# Patient Record
Sex: MALE | Race: WHITE | Employment: FULL TIME | URBAN - NONMETROPOLITAN AREA
[De-identification: names, ages, dates, MRNs, and addresses within clinical notes are randomized per-mention and may not be internally consistent; named-entity substitution may affect disease eponyms.]

---

## 2015-01-06 LAB
AVERAGE GLUCOSE: NORMAL
HBA1C MFR BLD: 5.7 %

## 2017-12-20 LAB
CHOLESTEROL, TOTAL: NORMAL
CHOLESTEROL/HDL RATIO: NORMAL
HDLC SERPL-MCNC: NORMAL MG/DL
LDL CHOLESTEROL CALCULATED: NORMAL
TRIGL SERPL-MCNC: NORMAL MG/DL
VLDLC SERPL CALC-MCNC: NORMAL MG/DL

## 2019-06-22 PROBLEM — R00.0 TACHYCARDIA: Status: ACTIVE | Noted: 2019-06-22

## 2019-06-22 PROBLEM — N52.9 ERECTILE DYSFUNCTION: Status: ACTIVE | Noted: 2019-06-22

## 2019-06-22 PROBLEM — G89.29 CHRONIC PAIN: Status: ACTIVE | Noted: 2019-06-22

## 2019-06-22 PROBLEM — E66.09 CLASS 2 OBESITY DUE TO EXCESS CALORIES WITH BODY MASS INDEX (BMI) OF 35.0 TO 35.9 IN ADULT: Status: ACTIVE | Noted: 2019-06-22

## 2019-06-22 PROBLEM — E66.812 CLASS 2 OBESITY DUE TO EXCESS CALORIES WITH BODY MASS INDEX (BMI) OF 35.0 TO 35.9 IN ADULT: Status: ACTIVE | Noted: 2019-06-22

## 2019-06-22 PROBLEM — I10 HYPERTENSION: Status: ACTIVE | Noted: 2019-06-22

## 2019-06-22 NOTE — PROGRESS NOTES
19  Carlo Parmar : 1965 Sex: male  Age: 52 y.o. No chief complaint on file. This is a patient who did not follow-up with Dr Mathew Vázquez. Because of the delay I will send him to Dr. Bassam Ramires. Patient is not having GI symptomatology. He states his restless leg symptoms have worsened. He is now using a full milligram of Requip. Patient is refusing prostate exam today because he has had some GI problems. The patient also is concerned about low energy levels. Previously had documented low testosterone levels. He did have an MRI of his pituitary gland to assess this and did see endocrinology. They were thinking that it was secondary to his chronic narcotic use. Patient does have chronic pain but he and I had a joseluis discussion about trying to wean narcotic medications. I have given him a schedule how to do this over the next 3 or 4 months. We will check baseline levels of luteinizing hormone, follicle-stimulating hormone, thyroid-stimulating hormone and other blood work in a fasting state. This will also need to be repeated at least in terms of the testosterone to verify low testosterone levels. Patient may benefit from androgen gel or something similar but probably he would benefit the most if he is able to wean from narcotic analgesics.       Review of Systems  Health Maintenance:  Colonoscopy Screening - (2015)  Influenza Vaccination - (2015)  Physical Exam - (2018)  Prostate Exam - (2018)  Psa Test - (2018)  Rectal Exam - (2018)  Colonoscopy - (2015) KOLOZSI-NEXT   Medical Problems:  Hypertension, Hypercholesterolemia, Osteoarthritis  Panic Disorder - (2014) RESTARTED ZOLOFT  Anxiety  Decreased Libido - (8/10/2012) EVALUATED BY DARREN  Cervical Radiculopathy - (2017) RECURRENT  Lumbar Strain - (2016) RECURRENT  Tachycardia - (2014) RESTARTED TOPROL  Hypogonadism - (2017) REPEAT [facility] work-up underway 2019  Restless Leg Syndrome - (6/19/2018) TRIAL REQUIP-increase Requip June 25, 2019  Carpal Tunnel Syndrome - (12/21/2018) RIGHT-EMG ORDERED  Right Knee Pain - (12/21/2018) ? ? OA  Reviewed, no changes. FH:  Father:  Coronary Artery Disease (CAD). Mother:  . (Hx)  Reviewed, no changes. SH:  Marital: . Personal Habits: Cigarette Use: Negative For current cigarette smoker. Alcohol: Rarely consumes  alcohol. Exercise Type: exercises sporadically. Reviewed, no changes. Date: 06/25/2019  Was the patient queried about smoking behavior? Yes   Does the patient currently smoke? Smoking: Patient is a current smoker, smokes some days -  CIGARS. Current Outpatient Medications:     ALPRAZolam (XANAX) 0.25 MG tablet, Take 0.25 mg by mouth 3 times daily as needed for Sleep., Disp: , Rfl:     metoprolol succinate (TOPROL XL) 50 MG extended release tablet, Take 1 tablet by mouth daily, Disp: 30 tablet, Rfl: 5    rosuvastatin (CRESTOR) 20 MG tablet, Take 1 tablet by mouth daily, Disp: 30 tablet, Rfl: 3    sertraline (ZOLOFT) 100 MG tablet, Take 2 tablets by mouth daily, Disp: 60 tablet, Rfl: 5    sildenafil (VIAGRA) 50 MG tablet, Take 1 tablet by mouth as needed for Erectile Dysfunction, Disp: 30 tablet, Rfl: 0    [START ON 7/25/2019] HYDROcodone-acetaminophen (NORCO) 7.5-325 MG per tablet, Take 1 tablet by mouth every 6 hours as needed for Pain for up to 30 days. , Disp: 120 tablet, Rfl: 0    [START ON 8/24/2019] HYDROcodone-acetaminophen (NORCO) 7.5-325 MG per tablet, Take 1 tablet by mouth every 6 hours as needed for Pain for up to 30 days. , Disp: 120 tablet, Rfl: 0    rOPINIRole (REQUIP) 1 MG tablet, Take 1 tablet by mouth nightly One po qhs, Disp: 30 tablet, Rfl: 5    naproxen (NAPROSYN) 500 MG tablet, Take 1 tablet by mouth 2 times daily as needed for Pain Take with food, Disp: 14 tablet, Rfl: 0    valsartan-hydrochlorothiazide (DIOVAN HCT) 320-25 MG per tablet, Take 1 tablet by mouth daily for 7 days, Disp: 7 tablet, Rfl: 0  No Known Allergies    Past Medical History:   Diagnosis Date    Anxiety     Carpal tunnel syndrome of right wrist     Cervical radiculopathy 04/11/2016    recurrent    Chronic pain     Erectile dysfunction     Hyperlipidemia     Hypertension     Hypogonadism in male 12/20/2017    Lumbar strain 12/13/2016    recurrent    Osteoarthritis     Panic disorder 07/23/2014    Restless legs syndrome     Sleep apnea     Tachycardia 07/23/2014     Past Surgical History:   Procedure Laterality Date    NECK SURGERY      ablation     Family History   Problem Relation Age of Onset    Mental Illness Father         anxiety    Coronary Art Dis Father      Social History     Socioeconomic History    Marital status:      Spouse name: Not on file    Number of children: Not on file    Years of education: Not on file    Highest education level: Not on file   Occupational History    Not on file   Social Needs    Financial resource strain: Not on file    Food insecurity:     Worry: Not on file     Inability: Not on file    Transportation needs:     Medical: Not on file     Non-medical: Not on file   Tobacco Use    Smoking status: Never Smoker    Smokeless tobacco: Never Used   Substance and Sexual Activity    Alcohol use: Yes     Comment: rarely consumes    Drug use: No    Sexual activity: Not on file   Lifestyle    Physical activity:     Days per week: Not on file     Minutes per session: Not on file    Stress: Not on file   Relationships    Social connections:     Talks on phone: Not on file     Gets together: Not on file     Attends Sikhism service: Not on file     Active member of club or organization: Not on file     Attends meetings of clubs or organizations: Not on file     Relationship status: Not on file    Intimate partner violence:     Fear of current or ex partner: Not on file     Emotionally abused: Not on file     Physically abused: Not on file     Forced sexual activity: Not on file   Other Topics Concern    Not on file   Social History Narrative    Not on file       Vitals:    06/25/19 1611   BP: 136/80   Pulse: 86   Temp: 97 °F (36.1 °C)   SpO2: 99%   Weight: 295 lb (133.8 kg)   Height: 6' 2\" (1.88 m)       Physical Exam  Objective    Exam:  Const: Appears healthy,well developed and well nourished. Appears obese. Eyes: EOMI in both eyes. PERRL. ENMT: External canals are clear and dry. Tympanic membranes are intact. External nose WNL. Moistness and normal color of the nasal mucosae. Septum is in the midline. Dentition is in good  repair. Gums appear healthy. Posterior pharynx shows no exudate, irritation or redness. Neck: Supple and symmetric. Palpation reveals no adenopathy. No masses appreciated. Thyroid  exhibits no nodules or thyromegaly. No JVD. Resp: Respirations are unlabored. Respiration rate is normal. Auscultate good airflow. No rales,  rhonchi or wheezes appreciated over the lungs bilaterally. CV: Rhythm is regular. S1 is normal. S2 is normal. Grade 2/6, systolic murmur. Carotids: no  bruits. Abdominal aorta is not palpable. Pedal pulses: 2+ and equal bilaterally. Extremities: No clubbing, cyanosis or edema. Abdomen: Bowel sounds are normoactive. Palpation of the abdomen reveals softness, but no  distension, organomegaly or tenderness. No abdominal masses. No palpable hepatosplenomegaly. Musculo: Walks with a normal gait. Upper Extremities: Full ROM bilaterally. Lower Extremities:  Crepitation of the lower extremities. Skin: Skin is warm and dry. Neuro: Alert and oriented x3. Mood is normal. Affect is normal. Speech is articulate and fluent. Reflexes: DTR's are intact, symmetric and 2+ bilaterally, Tinel sign is negative but Phalen's sign is  positive on the right. Psych: Patient's attitude is cooperative. Patient's affect is appropriate. Judgement is realistic. Insight  is appropriate.   Diagnoses and all orders for this visit:    Mixed hyperlipidemia  - rosuvastatin (CRESTOR) 20 MG tablet; Take 1 tablet by mouth daily    Essential hypertension  -     Comprehensive Metabolic Panel; Future    Erectile dysfunction due to arterial insufficiency  -     sildenafil (VIAGRA) 50 MG tablet; Take 1 tablet by mouth as needed for Erectile Dysfunction    Other chronic pain  -     Discontinue: HYDROcodone-acetaminophen (NORCO) 7.5-325 MG per tablet; Take 1 tablet by mouth every 6 hours as needed for Pain for up to 30 days.  -     HYDROcodone-acetaminophen (NORCO) 7.5-325 MG per tablet; Take 1 tablet by mouth every 6 hours as needed for Pain for up to 30 days.  -     HYDROcodone-acetaminophen (NORCO) 7.5-325 MG per tablet; Take 1 tablet by mouth every 6 hours as needed for Pain for up to 30 days. Primary osteoarthritis involving multiple joints  -     HYDROcodone-acetaminophen (NORCO) 7.5-325 MG per tablet; Take 1 tablet by mouth every 6 hours as needed for Pain for up to 30 days. Tachycardia  -     metoprolol succinate (TOPROL XL) 50 MG extended release tablet; Take 1 tablet by mouth daily    Class 2 obesity due to excess calories without serious comorbidity with body mass index (BMI) of 35.0 to 35.9 in adult    Hypogonadism in male  -     PSA SCREENING; Future  -     Testosterone; Future  -     Follicle Stimulating Hormone; Future  -     LUTEINIZING HORMONE; Future  -     TSH; Future    Restless leg  -     Discontinue: rOPINIRole (REQUIP) 0.5 MG tablet; Take 2 tablets by mouth nightly One po qhs  -     CBC; Future  -     HYDROcodone-acetaminophen (NORCO) 7.5-325 MG per tablet; Take 1 tablet by mouth every 6 hours as needed for Pain for up to 30 days. -     rOPINIRole (REQUIP) 1 MG tablet; Take 1 tablet by mouth nightly One po qhs    MAURICIO (generalized anxiety disorder)  -     sertraline (ZOLOFT) 100 MG tablet;  Take 2 tablets by mouth daily    Malignant neoplasm of colon, unspecified part of colon [facility])  -     External Referral To Gastroenterology    Patient will have work-up regarding his low testosterone level. He will be seen back in 3 months. I have given him a gradual to try to wean narcotic analgesics. She will have a prostate exam next visit. Return in about 3 months (around 9/25/2019).       Seen By:  Kurt Samuel MD

## 2019-06-25 ENCOUNTER — OFFICE VISIT (OUTPATIENT)
Dept: FAMILY MEDICINE CLINIC | Age: 54
End: 2019-06-25
Payer: COMMERCIAL

## 2019-06-25 VITALS
TEMPERATURE: 98.7 F | WEIGHT: 295 LBS | BODY MASS INDEX: 37.86 KG/M2 | DIASTOLIC BLOOD PRESSURE: 84 MMHG | HEIGHT: 74 IN | HEART RATE: 106 BPM | OXYGEN SATURATION: 93 % | SYSTOLIC BLOOD PRESSURE: 116 MMHG

## 2019-06-25 VITALS
BODY MASS INDEX: 37.86 KG/M2 | TEMPERATURE: 97 F | OXYGEN SATURATION: 99 % | HEART RATE: 86 BPM | HEIGHT: 74 IN | DIASTOLIC BLOOD PRESSURE: 80 MMHG | WEIGHT: 295 LBS | SYSTOLIC BLOOD PRESSURE: 136 MMHG

## 2019-06-25 DIAGNOSIS — F41.1 GAD (GENERALIZED ANXIETY DISORDER): ICD-10-CM

## 2019-06-25 DIAGNOSIS — I10 ESSENTIAL HYPERTENSION: ICD-10-CM

## 2019-06-25 DIAGNOSIS — C18.9 MALIGNANT NEOPLASM OF COLON, UNSPECIFIED PART OF COLON (HCC): ICD-10-CM

## 2019-06-25 DIAGNOSIS — M15.9 PRIMARY OSTEOARTHRITIS INVOLVING MULTIPLE JOINTS: ICD-10-CM

## 2019-06-25 DIAGNOSIS — E29.1 HYPOGONADISM IN MALE: ICD-10-CM

## 2019-06-25 DIAGNOSIS — R00.0 TACHYCARDIA: ICD-10-CM

## 2019-06-25 DIAGNOSIS — N52.01 ERECTILE DYSFUNCTION DUE TO ARTERIAL INSUFFICIENCY: ICD-10-CM

## 2019-06-25 DIAGNOSIS — E66.09 CLASS 2 OBESITY DUE TO EXCESS CALORIES WITHOUT SERIOUS COMORBIDITY WITH BODY MASS INDEX (BMI) OF 35.0 TO 35.9 IN ADULT: ICD-10-CM

## 2019-06-25 DIAGNOSIS — G89.29 OTHER CHRONIC PAIN: ICD-10-CM

## 2019-06-25 DIAGNOSIS — G25.81 RESTLESS LEG: ICD-10-CM

## 2019-06-25 DIAGNOSIS — E78.2 MIXED HYPERLIPIDEMIA: Primary | ICD-10-CM

## 2019-06-25 PROCEDURE — 99214 OFFICE O/P EST MOD 30 MIN: CPT | Performed by: INTERNAL MEDICINE

## 2019-06-25 RX ORDER — HYDROCODONE BITARTRATE AND ACETAMINOPHEN 7.5; 325 MG/1; MG/1
1 TABLET ORAL EVERY 6 HOURS PRN
Qty: 120 TABLET | Refills: 0 | Status: SHIPPED | OUTPATIENT
Start: 2019-08-24 | End: 2019-08-22 | Stop reason: SDUPTHER

## 2019-06-25 RX ORDER — SILDENAFIL 50 MG/1
50 TABLET, FILM COATED ORAL PRN
Qty: 30 TABLET | Refills: 0 | Status: SHIPPED | OUTPATIENT
Start: 2019-06-25 | End: 2020-01-03

## 2019-06-25 RX ORDER — ROPINIROLE 0.5 MG/1
0.5 TABLET, FILM COATED ORAL NIGHTLY
COMMUNITY
End: 2019-06-25 | Stop reason: SDUPTHER

## 2019-06-25 RX ORDER — ROPINIROLE 0.5 MG/1
1 TABLET, FILM COATED ORAL NIGHTLY
Qty: 30 TABLET | Refills: 5 | Status: SHIPPED | OUTPATIENT
Start: 2019-06-25 | End: 2019-06-25 | Stop reason: SDUPTHER

## 2019-06-25 RX ORDER — SERTRALINE HYDROCHLORIDE 100 MG/1
200 TABLET, FILM COATED ORAL DAILY
Qty: 60 TABLET | Refills: 5 | Status: SHIPPED | OUTPATIENT
Start: 2019-06-25 | End: 2020-04-09

## 2019-06-25 RX ORDER — METOPROLOL SUCCINATE 50 MG/1
50 TABLET, EXTENDED RELEASE ORAL DAILY
Qty: 30 TABLET | Refills: 5 | Status: SHIPPED | OUTPATIENT
Start: 2019-06-25 | End: 2019-09-24 | Stop reason: SDUPTHER

## 2019-06-25 RX ORDER — HYDROCODONE BITARTRATE AND ACETAMINOPHEN 7.5; 325 MG/1; MG/1
1 TABLET ORAL EVERY 6 HOURS PRN
COMMUNITY
End: 2019-06-25 | Stop reason: SDUPTHER

## 2019-06-25 RX ORDER — HYDROCODONE BITARTRATE AND ACETAMINOPHEN 7.5; 325 MG/1; MG/1
1 TABLET ORAL EVERY 6 HOURS PRN
Qty: 120 TABLET | Refills: 0 | Status: SHIPPED | OUTPATIENT
Start: 2019-06-25 | End: 2019-06-25 | Stop reason: SDUPTHER

## 2019-06-25 RX ORDER — ROSUVASTATIN CALCIUM 20 MG/1
20 TABLET, COATED ORAL DAILY
Qty: 30 TABLET | Refills: 3 | Status: SHIPPED | OUTPATIENT
Start: 2019-06-25 | End: 2020-07-24 | Stop reason: SDUPTHER

## 2019-06-25 RX ORDER — HYDROCODONE BITARTRATE AND ACETAMINOPHEN 7.5; 325 MG/1; MG/1
1 TABLET ORAL EVERY 6 HOURS PRN
Qty: 120 TABLET | Refills: 0 | Status: SHIPPED | OUTPATIENT
Start: 2019-07-25 | End: 2019-08-08 | Stop reason: SDUPTHER

## 2019-06-25 RX ORDER — ROPINIROLE 1 MG/1
1 TABLET, FILM COATED ORAL NIGHTLY
Qty: 30 TABLET | Refills: 5 | Status: SHIPPED | OUTPATIENT
Start: 2019-06-25 | End: 2020-06-15

## 2019-06-25 RX ORDER — METOPROLOL SUCCINATE 50 MG/1
TABLET, EXTENDED RELEASE ORAL DAILY
COMMUNITY
End: 2019-06-25 | Stop reason: SDUPTHER

## 2019-06-25 RX ORDER — ALPRAZOLAM 0.25 MG/1
0.25 TABLET ORAL 3 TIMES DAILY PRN
COMMUNITY
End: 2019-11-09 | Stop reason: SDUPTHER

## 2019-08-05 ENCOUNTER — TELEPHONE (OUTPATIENT)
Dept: FAMILY MEDICINE CLINIC | Age: 54
End: 2019-08-05

## 2019-08-08 DIAGNOSIS — G89.29 OTHER CHRONIC PAIN: ICD-10-CM

## 2019-08-08 DIAGNOSIS — G25.81 RESTLESS LEG: ICD-10-CM

## 2019-08-08 DIAGNOSIS — M15.9 PRIMARY OSTEOARTHRITIS INVOLVING MULTIPLE JOINTS: ICD-10-CM

## 2019-08-08 RX ORDER — HYDROCODONE BITARTRATE AND ACETAMINOPHEN 7.5; 325 MG/1; MG/1
1 TABLET ORAL EVERY 6 HOURS PRN
Qty: 120 TABLET | Refills: 0 | Status: SHIPPED | OUTPATIENT
Start: 2019-08-08 | End: 2019-09-07

## 2019-08-22 DIAGNOSIS — G89.29 OTHER CHRONIC PAIN: ICD-10-CM

## 2019-08-22 RX ORDER — HYDROCODONE BITARTRATE AND ACETAMINOPHEN 7.5; 325 MG/1; MG/1
1 TABLET ORAL EVERY 6 HOURS PRN
Qty: 120 TABLET | Refills: 0 | Status: SHIPPED | OUTPATIENT
Start: 2019-09-09 | End: 2019-09-24 | Stop reason: SDUPTHER

## 2019-09-24 ENCOUNTER — OFFICE VISIT (OUTPATIENT)
Dept: FAMILY MEDICINE CLINIC | Age: 54
End: 2019-09-24
Payer: COMMERCIAL

## 2019-09-24 VITALS
SYSTOLIC BLOOD PRESSURE: 134 MMHG | HEIGHT: 74 IN | HEART RATE: 98 BPM | DIASTOLIC BLOOD PRESSURE: 88 MMHG | TEMPERATURE: 98 F | OXYGEN SATURATION: 95 % | WEIGHT: 282 LBS | BODY MASS INDEX: 36.19 KG/M2

## 2019-09-24 DIAGNOSIS — R00.0 TACHYCARDIA: ICD-10-CM

## 2019-09-24 DIAGNOSIS — G25.81 RESTLESS LEGS SYNDROME: ICD-10-CM

## 2019-09-24 DIAGNOSIS — I10 ESSENTIAL HYPERTENSION: Primary | ICD-10-CM

## 2019-09-24 DIAGNOSIS — G89.29 OTHER CHRONIC PAIN: ICD-10-CM

## 2019-09-24 DIAGNOSIS — M15.9 PRIMARY OSTEOARTHRITIS INVOLVING MULTIPLE JOINTS: ICD-10-CM

## 2019-09-24 DIAGNOSIS — E78.2 MIXED HYPERLIPIDEMIA: ICD-10-CM

## 2019-09-24 PROCEDURE — G8427 DOCREV CUR MEDS BY ELIG CLIN: HCPCS | Performed by: INTERNAL MEDICINE

## 2019-09-24 PROCEDURE — 99213 OFFICE O/P EST LOW 20 MIN: CPT | Performed by: INTERNAL MEDICINE

## 2019-09-24 PROCEDURE — 3017F COLORECTAL CA SCREEN DOC REV: CPT | Performed by: INTERNAL MEDICINE

## 2019-09-24 PROCEDURE — 1036F TOBACCO NON-USER: CPT | Performed by: INTERNAL MEDICINE

## 2019-09-24 PROCEDURE — G8417 CALC BMI ABV UP PARAM F/U: HCPCS | Performed by: INTERNAL MEDICINE

## 2019-09-24 RX ORDER — METOPROLOL SUCCINATE 50 MG/1
50 TABLET, EXTENDED RELEASE ORAL DAILY
Qty: 30 TABLET | Refills: 5 | Status: SHIPPED
Start: 2019-09-24 | End: 2020-07-24 | Stop reason: SDUPTHER

## 2019-09-24 RX ORDER — HYDROCODONE BITARTRATE AND ACETAMINOPHEN 7.5; 325 MG/1; MG/1
1 TABLET ORAL EVERY 6 HOURS PRN
Qty: 120 TABLET | Refills: 0 | Status: SHIPPED | OUTPATIENT
Start: 2019-11-24 | End: 2019-10-30 | Stop reason: SDUPTHER

## 2019-09-24 RX ORDER — HYDROCODONE BITARTRATE AND ACETAMINOPHEN 7.5; 325 MG/1; MG/1
1 TABLET ORAL EVERY 6 HOURS PRN
Qty: 120 TABLET | Refills: 0 | Status: SHIPPED | OUTPATIENT
Start: 2019-09-24 | End: 2019-09-24 | Stop reason: SDUPTHER

## 2019-10-30 DIAGNOSIS — G89.29 OTHER CHRONIC PAIN: ICD-10-CM

## 2019-10-30 RX ORDER — HYDROCODONE BITARTRATE AND ACETAMINOPHEN 7.5; 325 MG/1; MG/1
1 TABLET ORAL EVERY 6 HOURS PRN
Qty: 40 TABLET | Refills: 0 | Status: SHIPPED
Start: 2019-11-24 | End: 2019-12-04

## 2019-11-01 ENCOUNTER — TELEPHONE (OUTPATIENT)
Dept: FAMILY MEDICINE CLINIC | Age: 54
End: 2019-11-01

## 2020-01-03 RX ORDER — SILDENAFIL 50 MG/1
TABLET, FILM COATED ORAL
Qty: 30 TABLET | Refills: 0 | Status: SHIPPED | OUTPATIENT
Start: 2020-01-03 | End: 2020-01-08 | Stop reason: SDUPTHER

## 2020-01-08 ENCOUNTER — OFFICE VISIT (OUTPATIENT)
Dept: FAMILY MEDICINE CLINIC | Age: 55
End: 2020-01-08
Payer: COMMERCIAL

## 2020-01-08 ENCOUNTER — HOSPITAL ENCOUNTER (OUTPATIENT)
Age: 55
Discharge: HOME | End: 2020-01-10
Payer: COMMERCIAL

## 2020-01-08 VITALS
DIASTOLIC BLOOD PRESSURE: 80 MMHG | HEART RATE: 89 BPM | OXYGEN SATURATION: 96 % | SYSTOLIC BLOOD PRESSURE: 126 MMHG | WEIGHT: 278 LBS | BODY MASS INDEX: 35.69 KG/M2

## 2020-01-08 PROBLEM — F41.1 GAD (GENERALIZED ANXIETY DISORDER): Status: ACTIVE | Noted: 2020-01-08

## 2020-01-08 LAB
ALBUMIN SERPL-MCNC: 4.8 G/DL (ref 3.5–5.2)
ALP BLD-CCNC: 88 U/L (ref 40–129)
ALT SERPL-CCNC: 24 U/L (ref 0–40)
AMPHETAMINE SCREEN, URINE: NOT DETECTED
ANION GAP SERPL CALCULATED.3IONS-SCNC: 16 MMOL/L (ref 7–16)
AST SERPL-CCNC: 20 U/L (ref 0–39)
BARBITURATE SCREEN URINE: NOT DETECTED
BENZODIAZEPINE SCREEN, URINE: NOT DETECTED
BILIRUB SERPL-MCNC: 0.4 MG/DL (ref 0–1.2)
BUN BLDV-MCNC: 25 MG/DL (ref 6–20)
CALCIUM SERPL-MCNC: 9.9 MG/DL (ref 8.6–10.2)
CANNABINOID SCREEN URINE: NOT DETECTED
CHLORIDE BLD-SCNC: 102 MMOL/L (ref 98–107)
CHOLESTEROL, TOTAL: 196 MG/DL (ref 0–199)
CO2: 24 MMOL/L (ref 22–29)
COCAINE METABOLITE SCREEN URINE: NOT DETECTED
CREAT SERPL-MCNC: 1.5 MG/DL (ref 0.7–1.2)
FENTANYL SCREEN, URINE: NOT DETECTED
GFR AFRICAN AMERICAN: 59
GFR NON-AFRICAN AMERICAN: 48 ML/MIN/1.73
GLUCOSE BLD-MCNC: 107 MG/DL (ref 74–99)
HDLC SERPL-MCNC: 34 MG/DL
LDL CHOLESTEROL CALCULATED: 105 MG/DL (ref 0–99)
Lab: NORMAL
METHADONE SCREEN, URINE: NOT DETECTED
OPIATE SCREEN URINE: NOT DETECTED
OXYCODONE URINE: NOT DETECTED
PHENCYCLIDINE SCREEN URINE: NOT DETECTED
POTASSIUM SERPL-SCNC: 4.4 MMOL/L (ref 3.5–5)
SODIUM BLD-SCNC: 142 MMOL/L (ref 132–146)
TOTAL PROTEIN: 7.5 G/DL (ref 6.4–8.3)
TRIGL SERPL-MCNC: 287 MG/DL (ref 0–149)
VLDLC SERPL CALC-MCNC: 57 MG/DL

## 2020-01-08 PROCEDURE — 1036F TOBACCO NON-USER: CPT | Performed by: INTERNAL MEDICINE

## 2020-01-08 PROCEDURE — G8417 CALC BMI ABV UP PARAM F/U: HCPCS | Performed by: INTERNAL MEDICINE

## 2020-01-08 PROCEDURE — G8484 FLU IMMUNIZE NO ADMIN: HCPCS | Performed by: INTERNAL MEDICINE

## 2020-01-08 PROCEDURE — 80053 COMPREHEN METABOLIC PANEL: CPT

## 2020-01-08 PROCEDURE — G8428 CUR MEDS NOT DOCUMENT: HCPCS | Performed by: INTERNAL MEDICINE

## 2020-01-08 PROCEDURE — 99213 OFFICE O/P EST LOW 20 MIN: CPT | Performed by: INTERNAL MEDICINE

## 2020-01-08 PROCEDURE — 80307 DRUG TEST PRSMV CHEM ANLYZR: CPT

## 2020-01-08 PROCEDURE — 80061 LIPID PANEL: CPT

## 2020-01-08 PROCEDURE — 3017F COLORECTAL CA SCREEN DOC REV: CPT | Performed by: INTERNAL MEDICINE

## 2020-01-08 PROCEDURE — G0480 DRUG TEST DEF 1-7 CLASSES: HCPCS

## 2020-01-08 PROCEDURE — 36415 COLL VENOUS BLD VENIPUNCTURE: CPT

## 2020-01-08 RX ORDER — SILDENAFIL 50 MG/1
TABLET, FILM COATED ORAL
Qty: 30 TABLET | Refills: 5 | Status: SHIPPED | OUTPATIENT
Start: 2020-01-08 | End: 2020-04-09 | Stop reason: SDUPTHER

## 2020-01-08 RX ORDER — HYDROCODONE BITARTRATE AND ACETAMINOPHEN 7.5; 325 MG/1; MG/1
1 TABLET ORAL EVERY 6 HOURS PRN
COMMUNITY
End: 2020-01-08 | Stop reason: SDUPTHER

## 2020-01-08 RX ORDER — HYDROCODONE BITARTRATE AND ACETAMINOPHEN 7.5; 325 MG/1; MG/1
1 TABLET ORAL EVERY 6 HOURS PRN
Qty: 120 TABLET | Refills: 0 | Status: SHIPPED | OUTPATIENT
Start: 2020-03-07 | End: 2020-03-09 | Stop reason: SDUPTHER

## 2020-01-08 RX ORDER — ALPRAZOLAM 0.25 MG/1
TABLET ORAL
COMMUNITY
Start: 2020-01-02 | End: 2020-03-09 | Stop reason: SDUPTHER

## 2020-01-08 RX ORDER — HYDROCODONE BITARTRATE AND ACETAMINOPHEN 7.5; 325 MG/1; MG/1
1 TABLET ORAL EVERY 6 HOURS PRN
Qty: 120 TABLET | Refills: 0 | Status: SHIPPED | OUTPATIENT
Start: 2020-02-08 | End: 2020-03-09

## 2020-01-08 RX ORDER — HYDROCODONE BITARTRATE AND ACETAMINOPHEN 7.5; 325 MG/1; MG/1
1 TABLET ORAL EVERY 6 HOURS PRN
Qty: 120 TABLET | Refills: 0 | Status: SHIPPED | OUTPATIENT
Start: 2020-01-08 | End: 2020-02-07

## 2020-01-08 NOTE — PROGRESS NOTES
Sleep apnea     Tachycardia 07/23/2014     Past Surgical History:   Procedure Laterality Date    NECK SURGERY      ablation     Family History   Problem Relation Age of Onset    Mental Illness Father         anxiety    Coronary Art Dis Father      Social History     Socioeconomic History    Marital status:      Spouse name: Not on file    Number of children: Not on file    Years of education: Not on file    Highest education level: Not on file   Occupational History    Not on file   Social Needs    Financial resource strain: Not on file    Food insecurity:     Worry: Not on file     Inability: Not on file    Transportation needs:     Medical: Not on file     Non-medical: Not on file   Tobacco Use    Smoking status: Never Smoker    Smokeless tobacco: Never Used   Substance and Sexual Activity    Alcohol use: Yes     Comment: rarely consumes    Drug use: No    Sexual activity: Not on file   Lifestyle    Physical activity:     Days per week: Not on file     Minutes per session: Not on file    Stress: Not on file   Relationships    Social connections:     Talks on phone: Not on file     Gets together: Not on file     Attends Taoism service: Not on file     Active member of club or organization: Not on file     Attends meetings of clubs or organizations: Not on file     Relationship status: Not on file    Intimate partner violence:     Fear of current or ex partner: Not on file     Emotionally abused: Not on file     Physically abused: Not on file     Forced sexual activity: Not on file   Other Topics Concern    Not on file   Social History Narrative    Not on file       Vitals:    01/08/20 1541   BP: 126/80   Pulse: 89   SpO2: 96%   Weight: 278 lb (126.1 kg)       Physical Exam  Objective    Exam:  Const: Appears healthy,well developed and well nourished. Appears obese. Eyes: EOMI in both eyes. PERRL. ENMT: External canals are clear and dry. Tympanic membranes are intact.  External nose WNL.  Moistness and normal color of the nasal mucosae. Septum is in the midline. Dentition is in good  repair. Gums appear healthy. Posterior pharynx shows no exudate, irritation or redness. Neck: Supple and symmetric. Palpation reveals no adenopathy. No masses appreciated. Thyroid  exhibits no nodules or thyromegaly. No JVD. Resp: Respirations are unlabored. Respiration rate is normal. Auscultate good airflow. No rales,  rhonchi or wheezes appreciated over the lungs bilaterally. CV: Rhythm is regular. S1 is normal. S2 is normal. Grade 2/6, systolic murmur. Carotids: no  bruits. Abdominal aorta is not palpable. Pedal pulses: 2+ and equal bilaterally. Extremities: No clubbing, cyanosis or edema. Abdomen: Bowel sounds are normoactive. Palpation of the abdomen reveals softness, but no  distension, organomegaly or tenderness. No abdominal masses. No palpable hepatosplenomegaly. Musculo: Walks with a normal gait. Upper Extremities: Full ROM bilaterally. Lower Extremities:  Crepitation of the lower extremities. Skin: Skin is warm and dry. Neuro: Alert and oriented x3. Mood is normal. Affect is normal. Speech is articulate and fluent. Reflexes: DTR's are intact, symmetric and 2+ bilaterally, Tinel sign is negative but Phalen's sign is  positive on the right. Psych: Patient's attitude is cooperative. Patient's affect is appropriate. Judgement is realistic. Insight  is appropriate. Obey Hodgson was seen today for medication refill. Diagnoses and all orders for this visit:    MAURICIO (generalized anxiety disorder)    Erectile dysfunction due to arterial insufficiency  -     sildenafil (VIAGRA) 50 MG tablet; take 1 tablet by mouth if needed for ERECTILE DYSFUNCTION    Primary osteoarthritis involving multiple joints  -     HYDROcodone-acetaminophen (NORCO) 7.5-325 MG per tablet; Take 1 tablet by mouth every 6 hours as needed for Pain for up to 30 days.  -     HYDROcodone-acetaminophen (NORCO) 7.5-325 MG per tablet;  Take 1 tablet by mouth every 6 hours as needed for Pain for up to 30 days.  -     HYDROcodone-acetaminophen (NORCO) 7.5-325 MG per tablet; Take 1 tablet by mouth every 6 hours as needed for Pain for up to 30 days. -     Comprehensive Metabolic Panel; Future  -     Lipid Panel; Future  -     URINE DRUG SCREEN; Future    Mixed hyperlipidemia    Tachycardia    Other chronic pain    Patient is to have lab work today including urine drug screen. [facility] report is checked. There is no evidence of abuse or diversion. He is to be seen back in 3 months. At that time prostate exam will need to be done along with PSA level. He will need to be referred to have colonoscopy.

## 2020-01-12 LAB
6AM URINE: <10 NG/ML
CODEINE, URINE: <20 NG/ML
HYDROCODONE, URINE: 299 NG/ML
HYDROMORPHONE, URINE: <20 NG/ML
MORPHINE URINE: <20 NG/ML
NORHYDROCODONE, URINE: 389 NG/ML
NOROXYCODONE, URINE: <20 NG/ML
NOROXYMORPHONE, URINE: <20 NG/ML
OXYCODONE, URINE CONFIRMATION: <20 NG/ML
OXYMORPHONE, URINE: <20 NG/ML

## 2020-01-28 ENCOUNTER — TELEPHONE (OUTPATIENT)
Dept: PRIMARY CARE CLINIC | Age: 55
End: 2020-01-28

## 2020-02-20 ENCOUNTER — TELEPHONE (OUTPATIENT)
Dept: FAMILY MEDICINE CLINIC | Age: 55
End: 2020-02-20

## 2020-02-20 RX ORDER — CYCLOBENZAPRINE HCL 10 MG
10 TABLET ORAL 3 TIMES DAILY PRN
Qty: 21 TABLET | Refills: 0 | Status: SHIPPED | OUTPATIENT
Start: 2020-02-20 | End: 2020-03-01

## 2020-02-25 ENCOUNTER — TELEPHONE (OUTPATIENT)
Dept: FAMILY MEDICINE CLINIC | Age: 55
End: 2020-02-25

## 2020-02-25 NOTE — TELEPHONE ENCOUNTER
Voicemail left for patient to return our call at their earliest convenience. Reminded pt to have labs drawn  Labs extended 2 weeks.

## 2020-03-10 RX ORDER — HYDROCODONE BITARTRATE AND ACETAMINOPHEN 7.5; 325 MG/1; MG/1
1 TABLET ORAL EVERY 6 HOURS PRN
Qty: 120 TABLET | Refills: 0 | Status: SHIPPED
Start: 2020-03-10 | End: 2020-04-09 | Stop reason: SDUPTHER

## 2020-03-10 RX ORDER — ALPRAZOLAM 0.25 MG/1
0.25 TABLET ORAL 3 TIMES DAILY PRN
Qty: 90 TABLET | Refills: 0 | Status: SHIPPED
Start: 2020-03-10 | End: 2020-07-24 | Stop reason: SDUPTHER

## 2020-03-18 ENCOUNTER — TELEPHONE (OUTPATIENT)
Dept: FAMILY MEDICINE CLINIC | Age: 55
End: 2020-03-18

## 2020-04-09 ENCOUNTER — TELEMEDICINE (OUTPATIENT)
Dept: PRIMARY CARE CLINIC | Age: 55
End: 2020-04-09
Payer: COMMERCIAL

## 2020-04-09 PROCEDURE — 99442 PR PHYS/QHP TELEPHONE EVALUATION 11-20 MIN: CPT | Performed by: INTERNAL MEDICINE

## 2020-04-09 RX ORDER — HYDROCODONE BITARTRATE AND ACETAMINOPHEN 7.5; 325 MG/1; MG/1
1 TABLET ORAL EVERY 6 HOURS PRN
Qty: 120 TABLET | Refills: 0 | Status: SHIPPED | OUTPATIENT
Start: 2020-04-09 | End: 2020-04-20 | Stop reason: SDUPTHER

## 2020-04-09 RX ORDER — HYDROCODONE BITARTRATE AND ACETAMINOPHEN 7.5; 325 MG/1; MG/1
1 TABLET ORAL EVERY 6 HOURS PRN
Qty: 120 TABLET | Refills: 0 | Status: SHIPPED | OUTPATIENT
Start: 2020-06-09 | End: 2020-06-29 | Stop reason: SDUPTHER

## 2020-04-09 RX ORDER — HYDROCODONE BITARTRATE AND ACETAMINOPHEN 7.5; 325 MG/1; MG/1
1 TABLET ORAL EVERY 6 HOURS PRN
Qty: 12 TABLET | Refills: 0 | Status: SHIPPED | OUTPATIENT
Start: 2020-06-09 | End: 2020-04-09

## 2020-04-09 RX ORDER — VALSARTAN AND HYDROCHLOROTHIAZIDE 320; 25 MG/1; MG/1
1 TABLET, FILM COATED ORAL DAILY
Qty: 7 TABLET | Refills: 0 | Status: SHIPPED
Start: 2020-04-09 | End: 2020-04-13

## 2020-04-09 RX ORDER — HYDROCODONE BITARTRATE AND ACETAMINOPHEN 7.5; 325 MG/1; MG/1
1 TABLET ORAL EVERY 6 HOURS PRN
Qty: 120 TABLET | Refills: 0 | Status: SHIPPED | OUTPATIENT
Start: 2020-05-09 | End: 2022-08-24 | Stop reason: SDUPTHER

## 2020-04-09 RX ORDER — SERTRALINE HYDROCHLORIDE 100 MG/1
200 TABLET, FILM COATED ORAL DAILY
Qty: 60 TABLET | Refills: 5 | Status: SHIPPED
Start: 2020-04-09 | End: 2020-10-20 | Stop reason: SDUPTHER

## 2020-04-09 RX ORDER — SILDENAFIL 50 MG/1
TABLET, FILM COATED ORAL
Qty: 15 TABLET | Refills: 1 | Status: SHIPPED | OUTPATIENT
Start: 2020-04-09 | End: 2020-10-20 | Stop reason: SDUPTHER

## 2020-04-09 RX ORDER — HYDROCODONE BITARTRATE AND ACETAMINOPHEN 7.5; 325 MG/1; MG/1
1 TABLET ORAL EVERY 6 HOURS PRN
Qty: 12 TABLET | Refills: 0 | Status: SHIPPED | OUTPATIENT
Start: 2020-05-09 | End: 2020-04-09

## 2020-04-09 NOTE — PROGRESS NOTES
19  Carlo Parmar : 1965 Sex: male  Age: 62 y.o. No chief complaint on file. 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 He is fairly physically active. His pain seems to be under reasonable control. This is a telephone visit and no objective evidence or examination is done. Patient will need some of his medications. Hyperlipidemia         Review of Systems  Health Maintenance:  Colonoscopy Screening - (2015)  Influenza Vaccination - (2019) REFUSES  Physical Exam - (2018)  Prostate Exam - (2018)  Psa Test - (2018)  Rectal Exam - (2018)  Colonoscopy - (2015) KOLOZSI-NEXT   Medical Problems:  Hypertension, Hypercholesterolemia, Osteoarthritis  Panic Disorder - (2014) RESTARTED ZOLOFT  Anxiety  Decreased Libido - (8/10/2012) EVALUATED BY DARREN  Cervical Radiculopathy - (2017) RECURRENT  Lumbar Strain - (2016) RECURRENT  Tachycardia - (2014) RESTARTED TOPROL  Hypogonadism - (2017) REPEAT [facility] work-up underway 2019  Restless Leg Syndrome - (2018) TRIAL REQUIP-increase Requip 2019  Carpal Tunnel Syndrome - (2018) RIGHT-EMG ORDERED  Right Knee Pain - (2018) ? ? OA  Reviewed, no changes. FH:  Father:  Coronary Artery Disease (CAD). Mother:  . (Hx)  Reviewed, no changes. SH:  Marital: . Personal Habits: Cigarette Use: Negative For current cigarette smoker. Alcohol: Rarely consumes  alcohol. Exercise Type: exercises sporadically. Reviewed, no changes. Date: 2020  Was the patient queried about smoking behavior? Yes   Does the patient currently smoke? Smoking: Patient is a current smoker, smokes some days -  CIGARS. Current Outpatient Medications:     HYDROcodone-acetaminophen (NORCO) 7.5-325 MG per tablet, Take 1 tablet by mouth every 6 hours as needed for Pain for up to 30 days. , Disp: 120 tablet, Rfl: 0    ALPRAZolam (XANAX) 0.25 MG tablet, Take 1 tablet by mouth 3 times daily as needed for Sleep for up to 30 days. , Disp: 90 tablet, Rfl: 0    sildenafil (VIAGRA) 50 MG tablet, take 1 tablet by mouth if needed for ERECTILE DYSFUNCTION, Disp: 30 tablet, Rfl: 5    metoprolol succinate (TOPROL XL) 50 MG extended release tablet, Take 1 tablet by mouth daily, Disp: 30 tablet, Rfl: 5    rosuvastatin (CRESTOR) 20 MG tablet, Take 1 tablet by mouth daily, Disp: 30 tablet, Rfl: 3    sertraline (ZOLOFT) 100 MG tablet, Take 2 tablets by mouth daily, Disp: 60 tablet, Rfl: 5    rOPINIRole (REQUIP) 1 MG tablet, Take 1 tablet by mouth nightly One po qhs, Disp: 30 tablet, Rfl: 5    naproxen (NAPROSYN) 500 MG tablet, Take 1 tablet by mouth 2 times daily as needed for Pain Take with food, Disp: 14 tablet, Rfl: 0    valsartan-hydrochlorothiazide (DIOVAN HCT) 320-25 MG per tablet, Take 1 tablet by mouth daily for 7 days, Disp: 7 tablet, Rfl: 0  No Known Allergies    Past Medical History:   Diagnosis Date    Anxiety     Carpal tunnel syndrome of right wrist     Cervical radiculopathy 04/11/2016    recurrent    Chronic pain     Erectile dysfunction     Hyperlipidemia     Hypertension     Hypogonadism in male 12/20/2017    Lumbar strain 12/13/2016    recurrent    Osteoarthritis     Panic disorder 07/23/2014    Restless legs syndrome     Sleep apnea     Tachycardia 07/23/2014     Past Surgical History:   Procedure Laterality Date    NECK SURGERY      ablation     Family History   Problem Relation Age of Onset    Mental Illness Father         anxiety    Coronary Art Dis Father      Social History     Socioeconomic History    Marital status:      Spouse name: Not on file    Number of children: Not on file    Years of education: Not on file    Highest education level: Not on file   Occupational History    Not on file   Social Needs    Financial resource strain: Not on file    Food insecurity     Worry: Not on file     Inability: Not on file   Linden Wrightspeed needs sent to the pharmacy. We will need to discuss colonoscopy at his next visit. He is to continue to try to wean pain medication.   Gisella Jensen

## 2020-04-13 RX ORDER — VALSARTAN AND HYDROCHLOROTHIAZIDE 320; 25 MG/1; MG/1
TABLET, FILM COATED ORAL
Qty: 7 TABLET | Refills: 0 | Status: SHIPPED
Start: 2020-04-13 | End: 2020-07-24 | Stop reason: SDUPTHER

## 2020-04-20 RX ORDER — HYDROCODONE BITARTRATE AND ACETAMINOPHEN 7.5; 325 MG/1; MG/1
1 TABLET ORAL EVERY 6 HOURS PRN
Qty: 120 TABLET | Refills: 0 | Status: SHIPPED
Start: 2020-04-20 | End: 2020-05-20

## 2020-06-15 RX ORDER — ROPINIROLE 1 MG/1
TABLET, FILM COATED ORAL
Qty: 30 TABLET | Refills: 5 | Status: SHIPPED
Start: 2020-06-15 | End: 2020-10-20 | Stop reason: SDUPTHER

## 2020-06-29 RX ORDER — HYDROCODONE BITARTRATE AND ACETAMINOPHEN 7.5; 325 MG/1; MG/1
1 TABLET ORAL EVERY 6 HOURS PRN
Qty: 120 TABLET | Refills: 0 | Status: SHIPPED | OUTPATIENT
Start: 2020-06-29 | End: 2020-07-24 | Stop reason: SDUPTHER

## 2020-06-30 ENCOUNTER — TELEPHONE (OUTPATIENT)
Dept: PRIMARY CARE CLINIC | Age: 55
End: 2020-06-30

## 2020-06-30 RX ORDER — HYDROCODONE BITARTRATE AND ACETAMINOPHEN 7.5; 325 MG/1; MG/1
1 TABLET ORAL EVERY 6 HOURS PRN
Qty: 120 TABLET | Refills: 0 | Status: CANCELLED | OUTPATIENT
Start: 2020-06-30 | End: 2020-07-30

## 2020-06-30 NOTE — TELEPHONE ENCOUNTER
Pt called [facility] office today asking about his norco script. He states the script is at the pharmacy but they are waiting on a signature. From what I can see in the computer it looks as though it has been printed. I gave Sujey Franklin (doctor's MA today) a note to check with Dr. Reddy Gresham and touch base with patient. Pt states he is going to [place] office to see if script is there.

## 2020-07-24 ENCOUNTER — VIRTUAL VISIT (OUTPATIENT)
Dept: FAMILY MEDICINE CLINIC | Age: 55
End: 2020-07-24
Payer: COMMERCIAL

## 2020-07-24 PROBLEM — F41.9 ANXIETY: Status: ACTIVE | Noted: 2020-07-24

## 2020-07-24 PROCEDURE — 99213 OFFICE O/P EST LOW 20 MIN: CPT | Performed by: INTERNAL MEDICINE

## 2020-07-24 PROCEDURE — 3017F COLORECTAL CA SCREEN DOC REV: CPT | Performed by: INTERNAL MEDICINE

## 2020-07-24 PROCEDURE — 1036F TOBACCO NON-USER: CPT | Performed by: INTERNAL MEDICINE

## 2020-07-24 PROCEDURE — G8417 CALC BMI ABV UP PARAM F/U: HCPCS | Performed by: INTERNAL MEDICINE

## 2020-07-24 PROCEDURE — G8427 DOCREV CUR MEDS BY ELIG CLIN: HCPCS | Performed by: INTERNAL MEDICINE

## 2020-07-24 RX ORDER — ALPRAZOLAM 0.25 MG/1
0.25 TABLET ORAL DAILY
Qty: 90 TABLET | Refills: 0 | Status: SHIPPED
Start: 2020-07-24 | End: 2020-10-20 | Stop reason: SDUPTHER

## 2020-07-24 RX ORDER — ROSUVASTATIN CALCIUM 20 MG/1
20 TABLET, COATED ORAL DAILY
Qty: 30 TABLET | Refills: 3 | Status: SHIPPED
Start: 2020-07-24 | End: 2020-10-20 | Stop reason: SDUPTHER

## 2020-07-24 RX ORDER — METOPROLOL SUCCINATE 50 MG/1
50 TABLET, EXTENDED RELEASE ORAL DAILY
Qty: 30 TABLET | Refills: 5 | Status: SHIPPED
Start: 2020-07-24 | End: 2021-01-19 | Stop reason: SDUPTHER

## 2020-07-24 RX ORDER — HYDROCODONE BITARTRATE AND ACETAMINOPHEN 7.5; 325 MG/1; MG/1
1 TABLET ORAL EVERY 6 HOURS PRN
Qty: 120 TABLET | Refills: 0 | Status: SHIPPED
Start: 2020-07-24 | End: 2020-10-20 | Stop reason: SDUPTHER

## 2020-07-24 RX ORDER — VALSARTAN AND HYDROCHLOROTHIAZIDE 320; 25 MG/1; MG/1
TABLET, FILM COATED ORAL
Qty: 90 TABLET | Refills: 0 | Status: SHIPPED
Start: 2020-07-24 | End: 2020-10-20 | Stop reason: SDUPTHER

## 2020-07-24 RX ORDER — HYDROCODONE BITARTRATE AND ACETAMINOPHEN 7.5; 325 MG/1; MG/1
1 TABLET ORAL EVERY 6 HOURS PRN
Qty: 120 TABLET | Refills: 0 | Status: SHIPPED
Start: 2020-08-24 | End: 2020-10-20 | Stop reason: SDUPTHER

## 2020-07-24 RX ORDER — HYDROCODONE BITARTRATE AND ACETAMINOPHEN 7.5; 325 MG/1; MG/1
1 TABLET ORAL EVERY 6 HOURS PRN
Qty: 12 TABLET | Refills: 0 | Status: SHIPPED
Start: 2020-09-23 | End: 2020-09-30 | Stop reason: SDUPTHER

## 2020-07-24 NOTE — PROGRESS NOTES
19  Carlo Parmar : 1965 Sex: male  Age: 62 y.o. Chief Complaint   Patient presents with    Medication Refill       This is a video visit with the patient. He is working from home. Pain seems to be adequately controlled on the patient is trying to taper off of narcotic medication. We did discuss Morena exercises of both cervical and lumbar spine today. Patient has not recently taken blood pressures but I have asked him to start doing this on a regular basis. He will have this done at his place of work. Patient is denying cardiac or respiratory symptoms. Hyperlipidemia         Review of Systems  Health Maintenance:  Colonoscopy Screening - (2015)  Influenza Vaccination - (2019) REFUSES  Physical Exam - (2018)  Prostate Exam - (2018)  Psa Test - (2018)  Rectal Exam - (2018)  Colonoscopy - (2015) KOLOZSI-NEXT   Medical Problems:  Hypertension, Hypercholesterolemia, Osteoarthritis  Panic Disorder - (2014) RESTARTED ZOLOFT  Anxiety  Decreased Libido - (8/10/2012) EVALUATED BY DARREN  Cervical Radiculopathy - (2017) RECURRENT  Lumbar Strain - (2016) RECURRENT  Tachycardia - (2014) RESTARTED TOPROL  Hypogonadism - (2017) REPEAT [facility] work-up underway 2019  Restless Leg Syndrome - (2018) TRIAL REQUIP-increase Requip 2019  Carpal Tunnel Syndrome - (2018) RIGHT-EMG ORDERED  Right Knee Pain - (2018) ? ? OA  Reviewed, no changes. FH:  Father:  Coronary Artery Disease (CAD). Mother:  . (Hx)  Reviewed, no changes. SH:  Marital: . Personal Habits: Cigarette Use: Negative For current cigarette smoker. Alcohol: Rarely consumes  alcohol. Exercise Type: exercises sporadically. Reviewed, no changes. Date: 2020  Was the patient queried about smoking behavior? Yes   Does the patient currently smoke? Smoking: Patient is a current smoker, smokes some days -  CIGARS.     Current Outpatient Medications: Tachycardia 07/23/2014     Past Surgical History:   Procedure Laterality Date    NECK SURGERY      ablation     Family History   Problem Relation Age of Onset    Mental Illness Father         anxiety    Coronary Art Dis Father      Social History     Socioeconomic History    Marital status:      Spouse name: Not on file    Number of children: Not on file    Years of education: Not on file    Highest education level: Not on file   Occupational History    Not on file   Social Needs    Financial resource strain: Not on file    Food insecurity     Worry: Not on file     Inability: Not on file    Transportation needs     Medical: Not on file     Non-medical: Not on file   Tobacco Use    Smoking status: Never Smoker    Smokeless tobacco: Never Used   Substance and Sexual Activity    Alcohol use: Yes     Comment: rarely consumes    Drug use: No    Sexual activity: Not on file   Lifestyle    Physical activity     Days per week: Not on file     Minutes per session: Not on file    Stress: Not on file   Relationships    Social connections     Talks on phone: Not on file     Gets together: Not on file     Attends Protestant service: Not on file     Active member of club or organization: Not on file     Attends meetings of clubs or organizations: Not on file     Relationship status: Not on file    Intimate partner violence     Fear of current or ex partner: Not on file     Emotionally abused: Not on file     Physically abused: Not on file     Forced sexual activity: Not on file   Other Topics Concern    Not on file   Social History Narrative    Not on file       There were no vitals filed for this visit. Denisha Mclean was seen today for medication refill. Diagnoses and all orders for this visit:    Essential hypertension    Anxiety  -     ALPRAZolam (XANAX) 0.25 MG tablet; Take 1 tablet by mouth daily for 30 days. Mixed hyperlipidemia  -     rosuvastatin (CRESTOR) 20 MG tablet;  Take 1 tablet by mouth daily    Tachycardia  -     metoprolol succinate (TOPROL XL) 50 MG extended release tablet; Take 1 tablet by mouth daily    Other chronic pain  -     HYDROcodone-acetaminophen (LORTAB) 7.5-325 MG per tablet; Take 1 tablet by mouth every 6 hours as needed for Pain for up to 30 days. Fill on 6-30-20  -     HYDROcodone-acetaminophen (LORTAB) 7.5-325 MG per tablet; Take 1 tablet by mouth every 6 hours as needed for Pain for up to 30 days. Fill on 6-9-20  -     HYDROcodone-acetaminophen (LORTAB) 7.5-325 MG per tablet; Take 1 tablet by mouth every 6 hours as needed for Pain for up to 30 days. Fill on 6-9-20    Restless legs syndrome    Other orders  -     valsartan-hydroCHLOROthiazide (DIOVAN-HCT) 320-25 MG per tablet; take 1 tablet by mouth once daily      Melinda West is a 62 y.o. male evaluated via telephone on 7/24/2020. Melinda West is a 62 y.o. male being evaluated by a Virtual Visit (video visit) encounter to address concerns as mentioned above. A caregiver was present when appropriate. Due to this being a TeleHealth encounter (During UYRAZ-34 public health emergency), evaluation of the following organ systems was limited: Vitals/Constitutional/EENT/Resp/CV/GI//MS/Neuro/Skin/Heme-Lymph-Imm. Pursuant to the emergency declaration under the [address] [place] [address], [address] [place] [address] and the [employer] and [employer] Act, this Virtual Visit was conducted with patient's (and/or legal guardian's) consent, to reduce the patient's risk of exposure to COVID-19 and provide necessary medical care. The patient (and/or legal guardian) has also been advised to contact this office for worsening conditions or problems, and seek emergency medical treatment and/or call 911 if deemed necessary.      Patient identification was verified at the start of the visit: Yes    Total time spent for this encounter: Not billed by time    Services were provided through a video synchronous discussion virtually to substitute for in-person clinic visit. Patient and provider were located at their individual homes. Morena exercises are discussed. Patient is to monitor blood pressures. I expect him back in the office within 3 months. He will need a prostate exam at that time. I will need to make sure whether or not colonoscopies are up-to-date. The patient is trying to lose weight. --Shadia Awan MD on 7/24/2020 at 12:43 PM    An electronic signature was used to authenticate this note.

## 2020-07-27 ENCOUNTER — HOSPITAL ENCOUNTER (EMERGENCY)
Age: 55
Discharge: HOME | End: 2020-07-27
Attending: EMERGENCY MEDICINE
Payer: COMMERCIAL

## 2020-07-27 ENCOUNTER — APPOINTMENT (OUTPATIENT)
Dept: CT IMAGING | Age: 55
End: 2020-07-27
Payer: COMMERCIAL

## 2020-07-27 VITALS
BODY MASS INDEX: 36.19 KG/M2 | HEART RATE: 101 BPM | SYSTOLIC BLOOD PRESSURE: 152 MMHG | DIASTOLIC BLOOD PRESSURE: 79 MMHG | TEMPERATURE: 98.4 F | WEIGHT: 282 LBS | OXYGEN SATURATION: 98 % | RESPIRATION RATE: 14 BRPM | HEIGHT: 74 IN

## 2020-07-27 LAB
ALBUMIN SERPL-MCNC: 5 G/DL (ref 3.5–5.2)
ALP BLD-CCNC: 80 U/L (ref 40–129)
ALT SERPL-CCNC: 17 U/L (ref 0–40)
ANION GAP SERPL CALCULATED.3IONS-SCNC: 13 MMOL/L (ref 7–16)
AST SERPL-CCNC: 18 U/L (ref 0–39)
BASOPHILS ABSOLUTE: 0.05 E9/L (ref 0–0.2)
BASOPHILS RELATIVE PERCENT: 0.4 % (ref 0–2)
BILIRUB SERPL-MCNC: 0.5 MG/DL (ref 0–1.2)
BUN BLDV-MCNC: 19 MG/DL (ref 6–20)
CALCIUM SERPL-MCNC: 10.1 MG/DL (ref 8.6–10.2)
CHLORIDE BLD-SCNC: 99 MMOL/L (ref 98–107)
CO2: 28 MMOL/L (ref 22–29)
CREAT SERPL-MCNC: 1.2 MG/DL (ref 0.7–1.2)
EOSINOPHILS ABSOLUTE: 0.22 E9/L (ref 0.05–0.5)
EOSINOPHILS RELATIVE PERCENT: 1.7 % (ref 0–6)
GFR AFRICAN AMERICAN: >60
GFR NON-AFRICAN AMERICAN: >60 ML/MIN/1.73
GLUCOSE BLD-MCNC: 113 MG/DL (ref 74–99)
HCT VFR BLD CALC: 44.9 % (ref 37–54)
HEMOGLOBIN: 14.6 G/DL (ref 12.5–16.5)
IMMATURE GRANULOCYTES #: 0.09 E9/L
IMMATURE GRANULOCYTES %: 0.7 % (ref 0–5)
LYMPHOCYTES ABSOLUTE: 0.81 E9/L (ref 1.5–4)
LYMPHOCYTES RELATIVE PERCENT: 6.3 % (ref 20–42)
MCH RBC QN AUTO: 29.4 PG (ref 26–35)
MCHC RBC AUTO-ENTMCNC: 32.5 % (ref 32–34.5)
MCV RBC AUTO: 90.5 FL (ref 80–99.9)
MONOCYTES ABSOLUTE: 0.96 E9/L (ref 0.1–0.95)
MONOCYTES RELATIVE PERCENT: 7.4 % (ref 2–12)
NEUTROPHILS ABSOLUTE: 10.82 E9/L (ref 1.8–7.3)
NEUTROPHILS RELATIVE PERCENT: 83.5 % (ref 43–80)
PDW BLD-RTO: 13.7 FL (ref 11.5–15)
PLATELET # BLD: 221 E9/L (ref 130–450)
PMV BLD AUTO: 9.5 FL (ref 7–12)
POTASSIUM SERPL-SCNC: 4.2 MMOL/L (ref 3.5–5)
RBC # BLD: 4.96 E12/L (ref 3.8–5.8)
SODIUM BLD-SCNC: 140 MMOL/L (ref 132–146)
TOTAL PROTEIN: 7.8 G/DL (ref 6.4–8.3)
WBC # BLD: 13 E9/L (ref 4.5–11.5)

## 2020-07-27 PROCEDURE — 96365 THER/PROPH/DIAG IV INF INIT: CPT

## 2020-07-27 PROCEDURE — 96367 TX/PROPH/DG ADDL SEQ IV INF: CPT

## 2020-07-27 PROCEDURE — 2580000003 HC RX 258: Performed by: EMERGENCY MEDICINE

## 2020-07-27 PROCEDURE — 73700 CT LOWER EXTREMITY W/O DYE: CPT

## 2020-07-27 PROCEDURE — 87070 CULTURE OTHR SPECIMN AEROBIC: CPT

## 2020-07-27 PROCEDURE — 80053 COMPREHEN METABOLIC PANEL: CPT

## 2020-07-27 PROCEDURE — 6360000002 HC RX W HCPCS: Performed by: EMERGENCY MEDICINE

## 2020-07-27 PROCEDURE — 99284 EMERGENCY DEPT VISIT MOD MDM: CPT

## 2020-07-27 PROCEDURE — 87040 BLOOD CULTURE FOR BACTERIA: CPT

## 2020-07-27 PROCEDURE — 2500000003 HC RX 250 WO HCPCS: Performed by: EMERGENCY MEDICINE

## 2020-07-27 PROCEDURE — 85025 COMPLETE CBC W/AUTO DIFF WBC: CPT

## 2020-07-27 PROCEDURE — 96375 TX/PRO/DX INJ NEW DRUG ADDON: CPT

## 2020-07-27 RX ORDER — MUPIROCIN CALCIUM 20 MG/G
CREAM TOPICAL
Qty: 15 G | Refills: 0 | Status: SHIPPED | OUTPATIENT
Start: 2020-07-27 | End: 2020-08-26

## 2020-07-27 RX ORDER — DOXYCYCLINE HYCLATE 100 MG
100 TABLET ORAL 2 TIMES DAILY
Qty: 14 TABLET | Refills: 0 | Status: SHIPPED | OUTPATIENT
Start: 2020-07-27 | End: 2020-08-03

## 2020-07-27 RX ORDER — CEFDINIR 300 MG/1
300 CAPSULE ORAL 2 TIMES DAILY
Qty: 14 CAPSULE | Refills: 0 | Status: SHIPPED | OUTPATIENT
Start: 2020-07-27 | End: 2020-08-03

## 2020-07-27 RX ORDER — OXYCODONE HYDROCHLORIDE AND ACETAMINOPHEN 5; 325 MG/1; MG/1
1 TABLET ORAL EVERY 6 HOURS PRN
Qty: 10 TABLET | Refills: 0 | Status: SHIPPED | OUTPATIENT
Start: 2020-07-27 | End: 2020-07-30

## 2020-07-27 RX ORDER — MORPHINE SULFATE 2 MG/ML
2 INJECTION, SOLUTION INTRAMUSCULAR; INTRAVENOUS ONCE
Status: COMPLETED | OUTPATIENT
Start: 2020-07-27 | End: 2020-07-27

## 2020-07-27 RX ADMIN — MORPHINE SULFATE 2 MG: 2 INJECTION, SOLUTION INTRAMUSCULAR; INTRAVENOUS at 12:26

## 2020-07-27 RX ADMIN — CEFEPIME HYDROCHLORIDE 2 G: 2 INJECTION, POWDER, FOR SOLUTION INTRAVENOUS at 12:29

## 2020-07-27 RX ADMIN — DOXYCYCLINE 100 MG: 100 INJECTION, POWDER, LYOPHILIZED, FOR SOLUTION INTRAVENOUS at 13:34

## 2020-07-27 ASSESSMENT — PAIN SCALES - GENERAL
PAINLEVEL_OUTOF10: 6
PAINLEVEL_OUTOF10: 5
PAINLEVEL_OUTOF10: 8

## 2020-07-27 ASSESSMENT — PAIN DESCRIPTION - LOCATION
LOCATION: LEG
LOCATION: KNEE

## 2020-07-27 ASSESSMENT — PAIN DESCRIPTION - PAIN TYPE
TYPE: ACUTE PAIN
TYPE: ACUTE PAIN

## 2020-07-27 ASSESSMENT — PAIN DESCRIPTION - ORIENTATION
ORIENTATION: LEFT
ORIENTATION: LEFT

## 2020-07-27 ASSESSMENT — PAIN DESCRIPTION - DESCRIPTORS: DESCRIPTORS: ACHING;TIGHTNESS

## 2020-07-27 NOTE — ED PROVIDER NOTES
HPI:  7/29/20,   Time: 8:53 AM EDT         Barber Huizar is a 62 y.o. male presenting to the ED for left leg injury and possible infection, beginning 3 days ago. The complaint has been persistent, moderate in severity, and worsened by nothing. Patient sustained injury to his left lower leg after [place] He has abrasion to the left lower leg but there is no drainage at this time. The area is red and there is a necrotic area at the center. ROS:   Pertinent positives and negatives are stated within HPI, all other systems reviewed and are negative.  --------------------------------------------- PAST HISTORY ---------------------------------------------  Past Medical History:  has a past medical history of Anxiety, Carpal tunnel syndrome of right wrist, Cervical radiculopathy, Chronic pain, Erectile dysfunction, Hyperlipidemia, Hypertension, Hypogonadism in male, Lumbar strain, Osteoarthritis, Panic disorder, Restless legs syndrome, Sleep apnea, and Tachycardia. Past Surgical History:  has a past surgical history that includes Neck surgery. Social History:  reports that he has never smoked. He has never used smokeless tobacco. He reports current alcohol use. He reports that he does not use drugs. Family History: family history includes Coronary Art Dis in his father; Mental Illness in his father. The patients home medications have been reviewed. Allergies: Patient has no known allergies.     -------------------------------------------------- RESULTS -------------------------------------------------  All laboratory and radiology results have been personally reviewed by myself   LABS:  Results for orders placed or performed during the hospital encounter of 07/27/20   Culture, Blood 1    Specimen: Blood   Result Value Ref Range    Blood Culture, Routine 24 Hours no growth    Culture, Blood 2    Specimen: Blood   Result Value Ref Range    Culture, Blood 2 24 Hours no growth    Culture, Wound    Specimen: Leg   Result Value Ref Range    WOUND/ABSCESS       Growth present, evaluating for:  Mixed Gram positive cocci     Comprehensive Metabolic Panel   Result Value Ref Range    Sodium 140 132 - 146 mmol/L    Potassium 4.2 3.5 - 5.0 mmol/L    Chloride 99 98 - 107 mmol/L    CO2 28 22 - 29 mmol/L    Anion Gap 13 7 - 16 mmol/L    Glucose 113 (H) 74 - 99 mg/dL    BUN 19 6 - 20 mg/dL    CREATININE 1.2 0.7 - 1.2 mg/dL    GFR Non-African American >60 >=60 mL/min/1.73    GFR African American >60     Calcium 10.1 8.6 - 10.2 mg/dL    Total Protein 7.8 6.4 - 8.3 g/dL    Alb 5.0 3.5 - 5.2 g/dL    Total Bilirubin 0.5 0.0 - 1.2 mg/dL    Alkaline Phosphatase 80 40 - 129 U/L    ALT 17 0 - 40 U/L    AST 18 0 - 39 U/L   CBC Auto Differential   Result Value Ref Range    WBC 13.0 (H) 4.5 - 11.5 E9/L    RBC 4.96 3.80 - 5.80 E12/L    Hemoglobin 14.6 12.5 - 16.5 g/dL    Hematocrit 44.9 37.0 - 54.0 %    MCV 90.5 80.0 - 99.9 fL    MCH 29.4 26.0 - 35.0 pg    MCHC 32.5 32.0 - 34.5 %    RDW 13.7 11.5 - 15.0 fL    Platelets 782 195 - 197 E9/L    MPV 9.5 7.0 - 12.0 fL    Neutrophils % 83.5 (H) 43.0 - 80.0 %    Immature Granulocytes % 0.7 0.0 - 5.0 %    Lymphocytes % 6.3 (L) 20.0 - 42.0 %    Monocytes % 7.4 2.0 - 12.0 %    Eosinophils % 1.7 0.0 - 6.0 %    Basophils % 0.4 0.0 - 2.0 %    Neutrophils Absolute 10.82 (H) 1.80 - 7.30 E9/L    Immature Granulocytes # 0.09 E9/L    Lymphocytes Absolute 0.81 (L) 1.50 - 4.00 E9/L    Monocytes Absolute 0.96 (H) 0.10 - 0.95 E9/L    Eosinophils Absolute 0.22 0.05 - 0.50 E9/L    Basophils Absolute 0.05 0.00 - 0.20 E9/L       RADIOLOGY:  Interpreted by Radiologist.  CT TIBIA FIBULA LEFT WO CONTRAST   Final Result      1. Diffuse subcutaneous edema in the left leg consistent with   cellulitis. No CT evidence of osteomyelitis.    2. Within the medial aspect of the left knee and proximal leg, there   is an ill-defined fluid collection measuring 8.2 x 2.0 x 11.2 cm,   which may represent phlegmon/abscess. Evaluation is somewhat limited   due to absence of intravenous contrast enhancement.          ------------------------- NURSING NOTES AND VITALS REVIEWED ---------------------------   The nursing notes within the ED encounter and vital signs as below have been reviewed. BP (!) 152/79   Pulse 101   Temp 98.4 °F (36.9 °C) (Oral)   Resp 14   Ht 6' 2\" (1.88 m)   Wt 282 lb (127.9 kg)   SpO2 98%   BMI 36.21 kg/m²   Oxygen Saturation Interpretation: Normal      ---------------------------------------------------PHYSICAL EXAM--------------------------------------      Constitutional/General: Alert and oriented x3, well appearing, non toxic in NAD  Head: NC/AT  Eyes: PERRL, EOMI  Mouth: Oropharynx clear, handling secretions, no trismus  Neck: Supple, full ROM, no meningeal signs  Pulmonary: Lungs clear to auscultation bilaterally, no wheezes, rales, or rhonchi. Not in respiratory distress  Cardiovascular:  Regular rate and rhythm, no murmurs, gallops, or rubs. 2+ distal pulses  Abdomen: Soft, non tender, non distended,   Extremities: Moves all extremities x 4. Warm and well perfused  Skin: warm , 5 x 5 area of erythema proximal left tibial area with a necrotic area of skin at the core. No drainage we were able to express minimal drainage for wound culture. Distal circulation and neuro exams intact. Neurologic: GCS 15,  Psych: Normal Affect      ------------------------------ ED COURSE/MEDICAL DECISION MAKING----------------------  Medications   morphine (PF) injection 2 mg (2 mg Intravenous Given 7/27/20 1226)   cefepime (MAXIPIME) 2 g IVPB extended (mini-bag) (0 g Intravenous Stopped 7/27/20 1306)   doxycycline (VIBRAMYCIN) 100 mg in dextrose 5 % 100 mL IVPB (0 mg Intravenous Stopped 7/27/20 1443)         Medical Decision Making:     Will check labs and will give IV fluids and IV antibiotics    Counseling: Patient was advised that the white count was slightly elevated and the CT scan did show some area of phlegmon or possible early abscess. Patient was given IV antibiotics and was reexamined and states that he felt better and that the redness actually is going down. Patient will be discharged on antibiotics and will be given a few pain pills and advised to follow-up closely with PCP in 2 days and if in the meantime he can return to the ER if he has any worsening of his condition   The emergency provider has spoken with the patient and discussed todays results, in addition to providing specific details for the plan of care and counseling regarding the diagnosis and prognosis. Questions are answered at this time and they are agreeable with the plan.      --------------------------------- IMPRESSION AND DISPOSITION ---------------------------------    IMPRESSION  1.  Cellulitis of left lower extremity        DISPOSITION  Disposition: Discharge to home  Patient condition is fair                  Jose Strickland MD  07/29/20 0900

## 2020-07-29 LAB — WOUND/ABSCESS: NORMAL

## 2020-08-01 LAB
BLOOD CULTURE, ROUTINE: NORMAL
CULTURE, BLOOD 2: NORMAL

## 2020-08-04 ENCOUNTER — OFFICE VISIT (OUTPATIENT)
Dept: FAMILY MEDICINE CLINIC | Age: 55
End: 2020-08-04
Payer: COMMERCIAL

## 2020-08-04 VITALS
BODY MASS INDEX: 36.19 KG/M2 | SYSTOLIC BLOOD PRESSURE: 148 MMHG | WEIGHT: 282 LBS | DIASTOLIC BLOOD PRESSURE: 70 MMHG | OXYGEN SATURATION: 98 % | HEIGHT: 74 IN | RESPIRATION RATE: 18 BRPM | HEART RATE: 82 BPM

## 2020-08-04 PROBLEM — L03.116 CELLULITIS AND ABSCESS OF LEFT LEG: Status: ACTIVE | Noted: 2020-08-04

## 2020-08-04 PROBLEM — L02.416 CELLULITIS AND ABSCESS OF LEFT LEG: Status: ACTIVE | Noted: 2020-08-04

## 2020-08-04 PROCEDURE — 3017F COLORECTAL CA SCREEN DOC REV: CPT | Performed by: INTERNAL MEDICINE

## 2020-08-04 PROCEDURE — 99214 OFFICE O/P EST MOD 30 MIN: CPT | Performed by: INTERNAL MEDICINE

## 2020-08-04 PROCEDURE — 1036F TOBACCO NON-USER: CPT | Performed by: INTERNAL MEDICINE

## 2020-08-04 PROCEDURE — G8417 CALC BMI ABV UP PARAM F/U: HCPCS | Performed by: INTERNAL MEDICINE

## 2020-08-04 PROCEDURE — G8427 DOCREV CUR MEDS BY ELIG CLIN: HCPCS | Performed by: INTERNAL MEDICINE

## 2020-08-04 PROCEDURE — 10160 PNXR ASPIR ABSC HMTMA BULLA: CPT | Performed by: INTERNAL MEDICINE

## 2020-08-04 ASSESSMENT — PATIENT HEALTH QUESTIONNAIRE - PHQ9
2. FEELING DOWN, DEPRESSED OR HOPELESS: 0
SUM OF ALL RESPONSES TO PHQ9 QUESTIONS 1 & 2: 0
SUM OF ALL RESPONSES TO PHQ QUESTIONS 1-9: 0
SUM OF ALL RESPONSES TO PHQ QUESTIONS 1-9: 0
1. LITTLE INTEREST OR PLEASURE IN DOING THINGS: 0

## 2020-08-04 NOTE — PROGRESS NOTES
19  Ray Conser : 1965 Sex: male  Age: 62 y.o. Chief Complaint   Patient presents with   [facility] ED Follow-up     patient went to [facility] ED on 2020 for cellulitis on left lower leg. Patient was recently in the emergency room. He slipped between a boat and a dock hitting his left knee. Patient currently is on dual antibiotic therapy. CT was done. He did have an elevated white blood cell count. Initially he did have a fever but this is resolved. He denies any night sweats. Swelling is improving. A phlegmon or possible abscess was described. And after informed consent, under sterile conditions, after infusion of approximately 3 cc of lidocaine 2% the area was attempted to be aspirated. There was not any evidence of pus or blood being aspirated. Hyperlipidemia         Review of Systems  Health Maintenance:  Colonoscopy Screening - (2015)  Influenza Vaccination - (2019) REFUSES  Physical Exam - (2018)  Prostate Exam - (2018)  Psa Test - (2018)  Rectal Exam - (2018)  Colonoscopy - (2015) KOLOZSI-NEXT   Medical Problems:  Hypertension, Hypercholesterolemia, Osteoarthritis  Panic Disorder - (2014) RESTARTED ZOLOFT  Anxiety  Decreased Libido - (8/10/2012) EVALUATED BY DARREN  Cervical Radiculopathy - (2017) RECURRENT  Lumbar Strain - (2016) RECURRENT  Tachycardia - (2014) RESTARTED TOPROL  Hypogonadism - (2017) REPEAT [facility] work-up underway 2019  Restless Leg Syndrome - (2018) TRIAL REQUIP-increase Requip 2019  Carpal Tunnel Syndrome - (2018) RIGHT-EMG ORDERED  Right Knee Pain - (2018) ? ? OA  Reviewed, no changes. FH:  Father:  Coronary Artery Disease (CAD). Mother:  . (Hx)  Reviewed, no changes. SH:  Marital: . Personal Habits: Cigarette Use: Negative For current cigarette smoker. Alcohol: Rarely consumes  alcohol. Exercise Type: exercises sporadically.   Reviewed, no changes. Date: 8/4/2020  Was the patient queried about smoking behavior? Yes   Does the patient currently smoke? Smoking: Patient is a current smoker, smokes some days -  CIGARS. Current Outpatient Medications:     mupirocin (BACTROBAN) 2 % cream, Apply topically 3 times daily. , Disp: 15 g, Rfl: 0    valsartan-hydroCHLOROthiazide (DIOVAN-HCT) 320-25 MG per tablet, take 1 tablet by mouth once daily, Disp: 90 tablet, Rfl: 0    rosuvastatin (CRESTOR) 20 MG tablet, Take 1 tablet by mouth daily, Disp: 30 tablet, Rfl: 3    metoprolol succinate (TOPROL XL) 50 MG extended release tablet, Take 1 tablet by mouth daily, Disp: 30 tablet, Rfl: 5    HYDROcodone-acetaminophen (LORTAB) 7.5-325 MG per tablet, Take 1 tablet by mouth every 6 hours as needed for Pain for up to 30 days. Fill on 6-30-20, Disp: 120 tablet, Rfl: 0    ALPRAZolam (XANAX) 0.25 MG tablet, Take 1 tablet by mouth daily for 30 days. , Disp: 90 tablet, Rfl: 0    [START ON 8/24/2020] HYDROcodone-acetaminophen (LORTAB) 7.5-325 MG per tablet, Take 1 tablet by mouth every 6 hours as needed for Pain for up to 30 days. Fill on 6-9-20, Disp: 120 tablet, Rfl: 0    [START ON 9/23/2020] HYDROcodone-acetaminophen (LORTAB) 7.5-325 MG per tablet, Take 1 tablet by mouth every 6 hours as needed for Pain for up to 30 days.  Fill on 6-9-20, Disp: 12 tablet, Rfl: 0    rOPINIRole (REQUIP) 1 MG tablet, take 1 tablet by mouth at bedtime, Disp: 30 tablet, Rfl: 5    sildenafil (VIAGRA) 50 MG tablet, take 1 tablet by mouth if needed for ERECTILE DYSFUNCTION, Disp: 15 tablet, Rfl: 1    sertraline (ZOLOFT) 100 MG tablet, Take 2 tablets by mouth daily, Disp: 60 tablet, Rfl: 5    naproxen (NAPROSYN) 500 MG tablet, Take 1 tablet by mouth 2 times daily as needed for Pain Take with food (Patient not taking: Reported on 7/24/2020), Disp: 14 tablet, Rfl: 0  No Known Allergies    Past Medical History:   Diagnosis Date    Anxiety     Carpal tunnel syndrome of right wrist     Cervical radiculopathy 04/11/2016    recurrent    Chronic pain     Erectile dysfunction     Hyperlipidemia     Hypertension     Hypogonadism in male 12/20/2017    Lumbar strain 12/13/2016    recurrent    Osteoarthritis     Panic disorder 07/23/2014    Restless legs syndrome     Sleep apnea     Tachycardia 07/23/2014     Past Surgical History:   Procedure Laterality Date    NECK SURGERY      ablation     Family History   Problem Relation Age of Onset    Mental Illness Father         anxiety    Coronary Art Dis Father      Social History     Socioeconomic History    Marital status:      Spouse name: Not on file    Number of children: Not on file    Years of education: Not on file    Highest education level: Not on file   Occupational History    Not on file   Social Needs    Financial resource strain: Not on file    Food insecurity     Worry: Not on file     Inability: Not on file    Transportation needs     Medical: Not on file     Non-medical: Not on file   Tobacco Use    Smoking status: Never Smoker    Smokeless tobacco: Never Used   Substance and Sexual Activity    Alcohol use: Yes     Comment: rarely consumes    Drug use: No    Sexual activity: Yes   Lifestyle    Physical activity     Days per week: Not on file     Minutes per session: Not on file    Stress: Not on file   Relationships    Social connections     Talks on phone: Not on file     Gets together: Not on file     Attends Jainism service: Not on file     Active member of club or organization: Not on file     Attends meetings of clubs or organizations: Not on file     Relationship status: Not on file    Intimate partner violence     Fear of current or ex partner: Not on file     Emotionally abused: Not on file     Physically abused: Not on file     Forced sexual activity: Not on file   Other Topics Concern    Not on file   Social History Narrative    Not on file       Vitals:    08/04/20 1111   BP: (!) 148/70 Pulse: 82   Resp: 18   SpO2: 98%   Weight: 282 lb (127.9 kg)   Height: 6' 2\" (1.88 m)       Physical Exam  Objective    Exam:  Const: Appears healthy,well developed and well nourished. Appears obese. Eyes: EOMI in both eyes. PERRL. ENMT: External canals are clear and dry. Tympanic membranes are intact. External nose WNL. Moistness and normal color of the nasal mucosae. Septum is in the midline. Dentition is in good  repair. Gums appear healthy. Posterior pharynx shows no exudate, irritation or redness. Neck: Supple and symmetric. Palpation reveals no adenopathy. No masses appreciated. Thyroid  exhibits no nodules or thyromegaly. No JVD. Resp: Respirations are unlabored. Respiration rate is normal. Auscultate good airflow. No rales,  rhonchi or wheezes appreciated over the lungs bilaterally. CV: Rhythm is regular. S1 is normal. S2 is normal. Grade 2/6, systolic murmur. Carotids: no  bruits. Abdominal aorta is not palpable. Pedal pulses: 2+ and equal bilaterally. Extremities: No clubbing, cyanosis or edema. Abdomen: Bowel sounds are normoactive. Palpation of the abdomen reveals softness, but no  distension, organomegaly or tenderness. No abdominal masses. No palpable hepatosplenomegaly. Musculo: Walks with a normal gait. Upper Extremities: Full ROM bilaterally. Lower Extremities:  Crepitation of the lower extremities. Skin: Area distal and medial to the left knee appears to be fluctuant and erythematous. Is also tender to the touch. Neuro: Alert and oriented x3. Mood is normal. Affect is normal. Speech is articulate and fluent. Reflexes: DTR's are intact, symmetric and 2+ bilaterally, Tinel sign is negative but Phalen's sign is  positive on the right. Psych: Patient's attitude is cooperative. Patient's affect is appropriate. Judgement is realistic. Insight  is appropriate. Yue Cuba was seen today for ed follow-up.     Diagnoses and all orders for this visit:    Essential hypertension    Cellulitis and abscess of left leg    Patient is to elevate the leg. He is to ice this. He is to keep it sterile. He is to complete antibiotic. He is to add probiotic. He is to call if it worsens in between now and a follow-up in 1 week.

## 2020-08-11 ENCOUNTER — OFFICE VISIT (OUTPATIENT)
Dept: FAMILY MEDICINE CLINIC | Age: 55
End: 2020-08-11
Payer: COMMERCIAL

## 2020-08-11 VITALS
HEART RATE: 84 BPM | BODY MASS INDEX: 36.21 KG/M2 | DIASTOLIC BLOOD PRESSURE: 80 MMHG | SYSTOLIC BLOOD PRESSURE: 126 MMHG | OXYGEN SATURATION: 94 % | HEIGHT: 74 IN

## 2020-08-11 PROCEDURE — 1036F TOBACCO NON-USER: CPT | Performed by: INTERNAL MEDICINE

## 2020-08-11 PROCEDURE — G8427 DOCREV CUR MEDS BY ELIG CLIN: HCPCS | Performed by: INTERNAL MEDICINE

## 2020-08-11 PROCEDURE — 99213 OFFICE O/P EST LOW 20 MIN: CPT | Performed by: INTERNAL MEDICINE

## 2020-08-11 PROCEDURE — G8417 CALC BMI ABV UP PARAM F/U: HCPCS | Performed by: INTERNAL MEDICINE

## 2020-08-11 PROCEDURE — 3017F COLORECTAL CA SCREEN DOC REV: CPT | Performed by: INTERNAL MEDICINE

## 2020-08-11 NOTE — PROGRESS NOTES
19  Ray Consluz : 1965 Sex: male  Age: 62 y.o. Chief Complaint   Patient presents with    Wound Infection     1 wk       Patient's cellulitic process appears to be much better. He has developed a hematoma in the area. I told him this will take a long time to resolve. He is not having systemic symptoms including fever, chills, sweats. His blood pressure is under excellent control today which is unusual for him. Hyperlipidemia         Review of Systems  Health Maintenance:  Colonoscopy Screening - (2015)  Influenza Vaccination - (2019) REFUSES  Physical Exam - (2018)  Prostate Exam - (2018)  Psa Test - (2018)  Rectal Exam - (2018)  Colonoscopy - (2015) KOLOZSI-NEXT   Medical Problems:  Hypertension, Hypercholesterolemia, Osteoarthritis  Panic Disorder - (2014) RESTARTED ZOLOFT  Anxiety  Decreased Libido - (8/10/2012) EVALUATED BY DARREN  Cervical Radiculopathy - (2017) RECURRENT  Lumbar Strain - (2016) RECURRENT  Tachycardia - (2014) RESTARTED TOPROL  Hypogonadism - (2017) REPEAT [facility] work-up underway 2019  Restless Leg Syndrome - (2018) TRIAL REQUIP-increase Requip 2019  Carpal Tunnel Syndrome - (2018) RIGHT-EMG ORDERED  Right Knee Pain - (2018) ? ? OA  Reviewed, no changes. FH:  Father:  Coronary Artery Disease (CAD). Mother:  . (Hx)  Reviewed, no changes. SH:  Marital: . Personal Habits: Cigarette Use: Negative For current cigarette smoker. Alcohol: Rarely consumes  alcohol. Exercise Type: exercises sporadically. Reviewed, no changes. Date: 2020  Was the patient queried about smoking behavior? Yes   Does the patient currently smoke? Smoking: Patient is a current smoker, smokes some days -  CIGARS. Current Outpatient Medications:     mupirocin (BACTROBAN) 2 % cream, Apply topically 3 times daily. , Disp: 15 g, Rfl: 0    valsartan-hydroCHLOROthiazide (DIOVAN-HCT) 320-25 MG per tablet, take 1 tablet by mouth once daily, Disp: 90 tablet, Rfl: 0    rosuvastatin (CRESTOR) 20 MG tablet, Take 1 tablet by mouth daily, Disp: 30 tablet, Rfl: 3    metoprolol succinate (TOPROL XL) 50 MG extended release tablet, Take 1 tablet by mouth daily, Disp: 30 tablet, Rfl: 5    HYDROcodone-acetaminophen (LORTAB) 7.5-325 MG per tablet, Take 1 tablet by mouth every 6 hours as needed for Pain for up to 30 days. Fill on 6-30-20, Disp: 120 tablet, Rfl: 0    ALPRAZolam (XANAX) 0.25 MG tablet, Take 1 tablet by mouth daily for 30 days. , Disp: 90 tablet, Rfl: 0    [START ON 8/24/2020] HYDROcodone-acetaminophen (LORTAB) 7.5-325 MG per tablet, Take 1 tablet by mouth every 6 hours as needed for Pain for up to 30 days. Fill on 6-9-20, Disp: 120 tablet, Rfl: 0    [START ON 9/23/2020] HYDROcodone-acetaminophen (LORTAB) 7.5-325 MG per tablet, Take 1 tablet by mouth every 6 hours as needed for Pain for up to 30 days.  Fill on 6-9-20, Disp: 12 tablet, Rfl: 0    rOPINIRole (REQUIP) 1 MG tablet, take 1 tablet by mouth at bedtime, Disp: 30 tablet, Rfl: 5    sildenafil (VIAGRA) 50 MG tablet, take 1 tablet by mouth if needed for ERECTILE DYSFUNCTION, Disp: 15 tablet, Rfl: 1    naproxen (NAPROSYN) 500 MG tablet, Take 1 tablet by mouth 2 times daily as needed for Pain Take with food, Disp: 14 tablet, Rfl: 0    sertraline (ZOLOFT) 100 MG tablet, Take 2 tablets by mouth daily, Disp: 60 tablet, Rfl: 5  No Known Allergies    Past Medical History:   Diagnosis Date    Anxiety     Carpal tunnel syndrome of right wrist     Cervical radiculopathy 04/11/2016    recurrent    Chronic pain     Erectile dysfunction     Hyperlipidemia     Hypertension     Hypogonadism in male 12/20/2017    Lumbar strain 12/13/2016    recurrent    Osteoarthritis     Panic disorder 07/23/2014    Restless legs syndrome     Sleep apnea     Tachycardia 07/23/2014     Past Surgical History:   Procedure Laterality Date    NECK SURGERY      ablation Family History   Problem Relation Age of Onset    Mental Illness Father         anxiety    Coronary Art Dis Father      Social History     Socioeconomic History    Marital status:      Spouse name: Not on file    Number of children: Not on file    Years of education: Not on file    Highest education level: Not on file   Occupational History    Not on file   Social Needs    Financial resource strain: Not on file    Food insecurity     Worry: Not on file     Inability: Not on file    Transportation needs     Medical: Not on file     Non-medical: Not on file   Tobacco Use    Smoking status: Never Smoker    Smokeless tobacco: Never Used   Substance and Sexual Activity    Alcohol use: Yes     Comment: rarely consumes    Drug use: No    Sexual activity: Yes   Lifestyle    Physical activity     Days per week: Not on file     Minutes per session: Not on file    Stress: Not on file   Relationships    Social connections     Talks on phone: Not on file     Gets together: Not on file     Attends Christian service: Not on file     Active member of club or organization: Not on file     Attends meetings of clubs or organizations: Not on file     Relationship status: Not on file    Intimate partner violence     Fear of current or ex partner: Not on file     Emotionally abused: Not on file     Physically abused: Not on file     Forced sexual activity: Not on file   Other Topics Concern    Not on file   Social History Narrative    Not on file       Vitals:    08/11/20 1224   BP: 126/80   Pulse: 84   SpO2: 94%   Height: 6' 2\" (1.88 m)       Physical Exam  Objective    Exam:  Const: Appears healthy,well developed and well nourished. Appears obese. Eyes: EOMI in both eyes. PERRL. ENMT: External canals are clear and dry. Tympanic membranes are intact. External nose WNL. Moistness and normal color of the nasal mucosae. Septum is in the midline. Dentition is in good  repair. Gums appear healthy.  Posterior pharynx shows no exudate, irritation or redness. Neck: Supple and symmetric. Palpation reveals no adenopathy. No masses appreciated. Thyroid  exhibits no nodules or thyromegaly. No JVD. Resp: Respirations are unlabored. Respiration rate is normal. Auscultate good airflow. No rales,  rhonchi or wheezes appreciated over the lungs bilaterally. CV: Rhythm is regular. S1 is normal. S2 is normal. Grade 2/6, systolic murmur. Carotids: no  bruits. Abdominal aorta is not palpable. Pedal pulses: 2+ and equal bilaterally. Extremities: No clubbing, cyanosis or edema. Abdomen: Bowel sounds are normoactive. Palpation of the abdomen reveals softness, but no  distension, organomegaly or tenderness. No abdominal masses. No palpable hepatosplenomegaly. Musculo: Walks with a normal gait. Upper Extremities: Full ROM bilaterally. Lower Extremities:  Crepitation of the lower extremities. Skin: Significant decrease in erythema surrounding the scab which is not actively draining. No fluctuance. Area of hematoma approximately 3 cm in diameter just below the anserine bursa on the left knee. Neuro: Alert and oriented x3. Mood is normal. Affect is normal. Speech is articulate and fluent. Reflexes: DTR's are intact, symmetric and 2+ bilaterally, Tinel sign is negative but Phalen's sign is  positive on the right. Psych: Patient's attitude is cooperative. Patient's affect is appropriate. Judgement is realistic. Insight  is appropriate. Sydney Lanza was seen today for wound infection. Diagnoses and all orders for this visit:    Cellulitis and abscess of left leg    Essential hypertension    Mixed hyperlipidemia    Patient is to be seen back in 10 weeks. I have asked him to continue to ice this as it should decrease inflammatory changes close to the area. He is to finish the antibiotic. There is no evidence of abscess currently.

## 2020-09-30 RX ORDER — HYDROCODONE BITARTRATE AND ACETAMINOPHEN 7.5; 325 MG/1; MG/1
1 TABLET ORAL EVERY 6 HOURS PRN
Qty: 120 TABLET | Refills: 0 | Status: SHIPPED
Start: 2020-09-30 | End: 2020-10-20 | Stop reason: SDUPTHER

## 2020-09-30 NOTE — TELEPHONE ENCOUNTER
Last Appointment:  8/11/2020  Future Appointments   Date Time Provider Channing Larry   10/20/2020  4:00 PM Florecita Gonsalez  W [address] [place] [address]

## 2020-10-20 ENCOUNTER — OFFICE VISIT (OUTPATIENT)
Dept: FAMILY MEDICINE CLINIC | Age: 55
End: 2020-10-20
Payer: COMMERCIAL

## 2020-10-20 VITALS
HEART RATE: 103 BPM | DIASTOLIC BLOOD PRESSURE: 88 MMHG | WEIGHT: 300 LBS | OXYGEN SATURATION: 97 % | HEIGHT: 74 IN | SYSTOLIC BLOOD PRESSURE: 134 MMHG | BODY MASS INDEX: 38.5 KG/M2

## 2020-10-20 PROBLEM — Z86.010 HISTORY OF COLONIC POLYPS: Status: ACTIVE | Noted: 2020-10-20

## 2020-10-20 PROBLEM — Z86.0100 HISTORY OF COLONIC POLYPS: Status: ACTIVE | Noted: 2020-10-20

## 2020-10-20 PROBLEM — Z12.5 PROSTATE CANCER SCREENING: Status: ACTIVE | Noted: 2020-10-20

## 2020-10-20 PROCEDURE — G8427 DOCREV CUR MEDS BY ELIG CLIN: HCPCS | Performed by: INTERNAL MEDICINE

## 2020-10-20 PROCEDURE — G8417 CALC BMI ABV UP PARAM F/U: HCPCS | Performed by: INTERNAL MEDICINE

## 2020-10-20 PROCEDURE — 1036F TOBACCO NON-USER: CPT | Performed by: INTERNAL MEDICINE

## 2020-10-20 PROCEDURE — 3017F COLORECTAL CA SCREEN DOC REV: CPT | Performed by: INTERNAL MEDICINE

## 2020-10-20 PROCEDURE — 99215 OFFICE O/P EST HI 40 MIN: CPT | Performed by: INTERNAL MEDICINE

## 2020-10-20 PROCEDURE — G8484 FLU IMMUNIZE NO ADMIN: HCPCS | Performed by: INTERNAL MEDICINE

## 2020-10-20 RX ORDER — VALSARTAN AND HYDROCHLOROTHIAZIDE 320; 25 MG/1; MG/1
TABLET, FILM COATED ORAL
Qty: 90 TABLET | Refills: 0 | Status: SHIPPED | OUTPATIENT
Start: 2020-10-20 | End: 2021-01-19 | Stop reason: SDUPTHER

## 2020-10-20 RX ORDER — ROPINIROLE 1 MG/1
TABLET, FILM COATED ORAL
Qty: 30 TABLET | Refills: 5 | Status: SHIPPED | OUTPATIENT
Start: 2020-10-20 | End: 2021-01-19 | Stop reason: SDUPTHER

## 2020-10-20 RX ORDER — SILDENAFIL 50 MG/1
TABLET, FILM COATED ORAL
Qty: 15 TABLET | Refills: 1 | Status: SHIPPED | OUTPATIENT
Start: 2020-10-20 | End: 2021-02-10 | Stop reason: SDUPTHER

## 2020-10-20 RX ORDER — ROSUVASTATIN CALCIUM 20 MG/1
20 TABLET, COATED ORAL DAILY
Qty: 30 TABLET | Refills: 3 | Status: SHIPPED | OUTPATIENT
Start: 2020-10-20 | End: 2021-01-19 | Stop reason: SDUPTHER

## 2020-10-20 RX ORDER — HYDROCODONE BITARTRATE AND ACETAMINOPHEN 7.5; 325 MG/1; MG/1
1 TABLET ORAL EVERY 6 HOURS PRN
Qty: 120 TABLET | Refills: 0 | Status: SHIPPED | OUTPATIENT
Start: 2020-12-19 | End: 2020-12-28 | Stop reason: SDUPTHER

## 2020-10-20 RX ORDER — HYDROCODONE BITARTRATE AND ACETAMINOPHEN 7.5; 325 MG/1; MG/1
1 TABLET ORAL EVERY 6 HOURS PRN
Qty: 120 TABLET | Refills: 0 | Status: SHIPPED | OUTPATIENT
Start: 2020-11-19 | End: 2022-11-24

## 2020-10-20 RX ORDER — ALPRAZOLAM 0.25 MG/1
0.25 TABLET ORAL DAILY
Qty: 90 TABLET | Refills: 0 | Status: SHIPPED | OUTPATIENT
Start: 2020-10-20 | End: 2021-04-20 | Stop reason: SDUPTHER

## 2020-10-20 RX ORDER — HYDROCODONE BITARTRATE AND ACETAMINOPHEN 7.5; 325 MG/1; MG/1
1 TABLET ORAL EVERY 6 HOURS PRN
Qty: 120 TABLET | Refills: 0 | Status: SHIPPED | OUTPATIENT
Start: 2020-10-20 | End: 2022-11-24

## 2020-10-20 RX ORDER — SERTRALINE HYDROCHLORIDE 100 MG/1
200 TABLET, FILM COATED ORAL DAILY
Qty: 60 TABLET | Refills: 5 | Status: SHIPPED | OUTPATIENT
Start: 2020-10-20 | End: 2021-01-19 | Stop reason: SDUPTHER

## 2020-10-20 NOTE — PROGRESS NOTES
19  Ray Conser : 1965 Sex: male  Age: 62 y.o. Chief Complaint   Patient presents with    Cellulitis     10 wk f/u       Patient comes in for overdue complete examination. Patient is overdue for colonoscopy. He did have colonic polyps back in . I will have her return referral to Dr. Ofelia Crockett. Patient is denying any headache. There is been no visual change. He denies dyspnea or chest pain. He has gained a fair amount of weight. Previously lost some weight using a keto diet but I told him that it is extremely difficult for especially at age 62 to start losing weight without having a significant commitment to do so. The patient will need to definitely calorie restrict significantly to lose weight. I told him he cannot rely on exercise to try to do this. Patient has not had any recurrent cellulitic process. He denies any GI complaints. He does have erectile dysfunction but no other  complaints. He did have an incident back in July when he was on vacation in [place] where he had difficulty urinating for 8 hours. He did notice some blood at the time. He denied any flank pain with this. It is not occurred since that time.     Hyperlipidemia         Review of Systems  Health Maintenance:  Colonoscopy Screening - (2015)-referred back 10/20/2020  Influenza Vaccination - (10/20/2020) REFUSES  Physical Exam - (10/20/2020)  Prostate Exam - (10/20/2020)  Psa Test - (10/20/2020)  Rectal Exam - (10/20/2020)  Colonoscopy - (2015) KOLOZSI-NEXT   Medical Problems:  Hypertension, Hypercholesterolemia, Osteoarthritis  Panic Disorder - (2014) RESTARTED ZOLOFT  Anxiety  Decreased Libido - (8/10/2012) EVALUATED BY DARREN  Cervical Radiculopathy - (2017) RECURRENT  Lumbar Strain - (2016) RECURRENT  Tachycardia - (2014) RESTARTED TOPROL  Hypogonadism - (2017) REPEAT [facility] work-up underway 2019  Restless Leg Syndrome - (2018) TRIAL REQUIP-increase Requip June 25, 2019  Carpal Tunnel Syndrome - (12/21/2018) RIGHT-EMG ORDERED  Right Knee Pain - (12/21/2018) ? ? OA  Reviewed, no changes. FH:  Father:  Coronary Artery Disease (CAD). Mother:  . (Hx)  Reviewed, no changes. SH:  Marital: . Personal Habits: Cigarette Use: Negative For current cigarette smoker. Alcohol: Rarely consumes  alcohol. Exercise Type: exercises sporadically. Reviewed, no changes. Date: 8/4/2020  Was the patient queried about smoking behavior? Yes   Does the patient currently smoke? Smoking: Patient is a current smoker, smokes some days -  CIGARS. Current Outpatient Medications:     [START ON 12/19/2020] HYDROcodone-acetaminophen (LORTAB) 7.5-325 MG per tablet, Take 1 tablet by mouth every 6 hours as needed for Pain for up to 30 days. Fill on 6-9-20, Disp: 120 tablet, Rfl: 0    [START ON 11/19/2020] HYDROcodone-acetaminophen (LORTAB) 7.5-325 MG per tablet, Take 1 tablet by mouth every 6 hours as needed for Pain for up to 30 days. Fill on 6-30-20, Disp: 120 tablet, Rfl: 0    HYDROcodone-acetaminophen (LORTAB) 7.5-325 MG per tablet, Take 1 tablet by mouth every 6 hours as needed for Pain for up to 30 days. Fill on 6-9-20, Disp: 120 tablet, Rfl: 0    ALPRAZolam (XANAX) 0.25 MG tablet, Take 1 tablet by mouth daily for 30 days. , Disp: 90 tablet, Rfl: 0    rosuvastatin (CRESTOR) 20 MG tablet, Take 1 tablet by mouth daily, Disp: 30 tablet, Rfl: 3    rOPINIRole (REQUIP) 1 MG tablet, take 1 tablet by mouth at bedtime, Disp: 30 tablet, Rfl: 5    valsartan-hydroCHLOROthiazide (DIOVAN-HCT) 320-25 MG per tablet, take 1 tablet by mouth once daily, Disp: 90 tablet, Rfl: 0    sildenafil (VIAGRA) 50 MG tablet, take 1 tablet by mouth if needed for ERECTILE DYSFUNCTION, Disp: 15 tablet, Rfl: 1    sertraline (ZOLOFT) 100 MG tablet, Take 2 tablets by mouth daily, Disp: 60 tablet, Rfl: 5    metoprolol succinate (TOPROL XL) 50 MG extended release tablet, Take 1 tablet by mouth daily, Disp: 30 tablet, Rfl: 5    naproxen (NAPROSYN) 500 MG tablet, Take 1 tablet by mouth 2 times daily as needed for Pain Take with food, Disp: 14 tablet, Rfl: 0  No Known Allergies    Past Medical History:   Diagnosis Date    Anxiety     Carpal tunnel syndrome of right wrist     Cervical radiculopathy 04/11/2016    recurrent    Chronic pain     Erectile dysfunction     Hyperlipidemia     Hypertension     Hypogonadism in male 12/20/2017    Lumbar strain 12/13/2016    recurrent    Osteoarthritis     Panic disorder 07/23/2014    Restless legs syndrome     Sleep apnea     Tachycardia 07/23/2014     Past Surgical History:   Procedure Laterality Date    NECK SURGERY      ablation     Family History   Problem Relation Age of Onset    Mental Illness Father         anxiety    Coronary Art Dis Father      Social History     Socioeconomic History    Marital status:      Spouse name: Not on file    Number of children: Not on file    Years of education: Not on file    Highest education level: Not on file   Occupational History    Not on file   Social Needs    Financial resource strain: Not on file    Food insecurity     Worry: Not on file     Inability: Not on file    Transportation needs     Medical: Not on file     Non-medical: Not on file   Tobacco Use    Smoking status: Never Smoker    Smokeless tobacco: Never Used   Substance and Sexual Activity    Alcohol use: Yes     Comment: rarely consumes    Drug use: No    Sexual activity: Yes   Lifestyle    Physical activity     Days per week: Not on file     Minutes per session: Not on file    Stress: Not on file   Relationships    Social connections     Talks on phone: Not on file     Gets together: Not on file     Attends Roman Catholic service: Not on file     Active member of club or organization: Not on file     Attends meetings of clubs or organizations: Not on file     Relationship status: Not on file    Intimate partner violence     Fear of current or ex URINALYSIS; Future    History of colonic polyps    Primary osteoarthritis involving multiple joints    Prostate cancer screening    Other orders  -     valsartan-hydroCHLOROthiazide (DIOVAN-HCT) 320-25 MG per tablet; take 1 tablet by mouth once daily    Patient is to have lab work today. He does not wish a flu vaccination. The patient is to be seen back in 3 months. Pain management is performed. OARRS report is verified.

## 2020-11-19 PROBLEM — Z12.5 PROSTATE CANCER SCREENING: Status: RESOLVED | Noted: 2020-10-20 | Resolved: 2020-11-19

## 2020-12-28 ENCOUNTER — TELEPHONE (OUTPATIENT)
Dept: FAMILY MEDICINE CLINIC | Age: 55
End: 2020-12-28

## 2020-12-28 RX ORDER — HYDROCODONE BITARTRATE AND ACETAMINOPHEN 7.5; 325 MG/1; MG/1
1 TABLET ORAL EVERY 6 HOURS PRN
Qty: 120 TABLET | Refills: 0 | Status: SHIPPED
Start: 2020-12-28 | End: 2021-01-19 | Stop reason: SDUPTHER

## 2020-12-28 NOTE — TELEPHONE ENCOUNTER
Pt called to say that he can't find the written script for Hydrocodone and is asking for you to send it to [facility]. He thinks he left it at school and he is not permitted in the building.

## 2021-01-19 ENCOUNTER — OFFICE VISIT (OUTPATIENT)
Dept: FAMILY MEDICINE CLINIC | Age: 56
End: 2021-01-19
Payer: COMMERCIAL

## 2021-01-19 VITALS
BODY MASS INDEX: 39.54 KG/M2 | SYSTOLIC BLOOD PRESSURE: 120 MMHG | HEART RATE: 110 BPM | DIASTOLIC BLOOD PRESSURE: 60 MMHG | OXYGEN SATURATION: 95 % | TEMPERATURE: 98.1 F | RESPIRATION RATE: 16 BRPM | WEIGHT: 308 LBS

## 2021-01-19 DIAGNOSIS — E78.2 MIXED HYPERLIPIDEMIA: ICD-10-CM

## 2021-01-19 DIAGNOSIS — E29.1 HYPOGONADISM IN MALE: ICD-10-CM

## 2021-01-19 DIAGNOSIS — I10 ESSENTIAL HYPERTENSION: Primary | ICD-10-CM

## 2021-01-19 DIAGNOSIS — G89.29 OTHER CHRONIC PAIN: ICD-10-CM

## 2021-01-19 DIAGNOSIS — R00.0 TACHYCARDIA: ICD-10-CM

## 2021-01-19 DIAGNOSIS — G25.81 RESTLESS LEG: ICD-10-CM

## 2021-01-19 DIAGNOSIS — F41.1 GAD (GENERALIZED ANXIETY DISORDER): ICD-10-CM

## 2021-01-19 PROCEDURE — G8484 FLU IMMUNIZE NO ADMIN: HCPCS | Performed by: INTERNAL MEDICINE

## 2021-01-19 PROCEDURE — G8427 DOCREV CUR MEDS BY ELIG CLIN: HCPCS | Performed by: INTERNAL MEDICINE

## 2021-01-19 PROCEDURE — 1036F TOBACCO NON-USER: CPT | Performed by: INTERNAL MEDICINE

## 2021-01-19 PROCEDURE — G8417 CALC BMI ABV UP PARAM F/U: HCPCS | Performed by: INTERNAL MEDICINE

## 2021-01-19 PROCEDURE — 99214 OFFICE O/P EST MOD 30 MIN: CPT | Performed by: INTERNAL MEDICINE

## 2021-01-19 PROCEDURE — 3017F COLORECTAL CA SCREEN DOC REV: CPT | Performed by: INTERNAL MEDICINE

## 2021-01-19 RX ORDER — VALSARTAN AND HYDROCHLOROTHIAZIDE 320; 25 MG/1; MG/1
TABLET, FILM COATED ORAL
Qty: 90 TABLET | Refills: 1 | Status: SHIPPED | OUTPATIENT
Start: 2021-01-19 | End: 2021-03-05

## 2021-01-19 RX ORDER — HYDROCODONE BITARTRATE AND ACETAMINOPHEN 7.5; 325 MG/1; MG/1
1 TABLET ORAL EVERY 6 HOURS PRN
Qty: 120 TABLET | Refills: 0 | Status: SHIPPED | OUTPATIENT
Start: 2021-01-19 | End: 2021-01-19 | Stop reason: SDUPTHER

## 2021-01-19 RX ORDER — SERTRALINE HYDROCHLORIDE 100 MG/1
200 TABLET, FILM COATED ORAL DAILY
Qty: 60 TABLET | Refills: 5 | Status: SHIPPED | OUTPATIENT
Start: 2021-01-19 | End: 2021-04-20 | Stop reason: SDUPTHER

## 2021-01-19 RX ORDER — ROSUVASTATIN CALCIUM 20 MG/1
20 TABLET, COATED ORAL DAILY
Qty: 30 TABLET | Refills: 5 | Status: SHIPPED
Start: 2021-01-19 | End: 2021-04-20 | Stop reason: SDUPTHER

## 2021-01-19 RX ORDER — ROPINIROLE 1 MG/1
TABLET, FILM COATED ORAL
Qty: 30 TABLET | Refills: 5 | Status: SHIPPED | OUTPATIENT
Start: 2021-01-19 | End: 2021-04-20 | Stop reason: SDUPTHER

## 2021-01-19 RX ORDER — HYDROCODONE BITARTRATE AND ACETAMINOPHEN 7.5; 325 MG/1; MG/1
1 TABLET ORAL EVERY 6 HOURS PRN
Qty: 120 TABLET | Refills: 0 | Status: SHIPPED | OUTPATIENT
Start: 2021-03-01 | End: 2021-04-20 | Stop reason: SDUPTHER

## 2021-01-19 RX ORDER — METOPROLOL SUCCINATE 50 MG/1
50 TABLET, EXTENDED RELEASE ORAL DAILY
Qty: 30 TABLET | Refills: 5 | Status: SHIPPED | OUTPATIENT
Start: 2021-01-19 | End: 2021-04-20 | Stop reason: SDUPTHER

## 2021-01-19 NOTE — PROGRESS NOTES
19  Carlo Parmar : 1965 Sex: male  Age: 60 y.o. No chief complaint on file. Patient continues to gain weight. He states that his dieting efforts have failed. He and I discussed other methods of weight loss. Patient did have a complete exam including prostate examination but he did not get his lab work as requested. I will put the lab work in again and I have asked him to make sure that he gets it over the next week. Previously the patient was evaluated for hypogonadism. He feels that this is contributing to his inability to lose weight. I told her not one of the etiologies of hypogonadism can be narcotic use. He is trying to wean his current dose. Patient has noticed a significant decline in muscular strength. Hip there has been decreased libido. The patient does not wish vaccination. The patient did see gastroenterology and does have colonoscopy scheduled in April. Hyperlipidemia        Review of Systems  Health Maintenance:  Colonoscopy Screening - (2015)-referred back 10/20/2020  Influenza Vaccination - (10/20/2020) REFUSES  Physical Exam - (10/20/2020)  Prostate Exam - (10/20/2020)  Psa Test - (10/20/2020)  Rectal Exam - (10/20/2020)  Colonoscopy - (2015) KOLOZSI-NEXT 2021  Medical Problems:  Hypertension, Hypercholesterolemia, Osteoarthritis  Panic Disorder - (2014) RESTARTED ZOLOFT  Anxiety  Decreased Libido - (8/10/2012) EVALUATED BY DARREN  Cervical Radiculopathy - (2017) RECURRENT  Lumbar Strain - (2016) RECURRENT  Tachycardia - (2014) RESTARTED TOPROL  Hypogonadism - (2017) REPEAT [facility] work-up underway 2019  Restless Leg Syndrome - (2018) TRIAL REQUIP-increase Requip 2019  Carpal Tunnel Syndrome - (2018) RIGHT-EMG ORDERED  Right Knee Pain - (2018) ? ? OA  Obesity longstanding  Reviewed, no changes. FH:  Father:  Coronary Artery Disease (CAD). Mother:  . (Hx)  Reviewed, no changes.   SH:  Marital: . Personal Habits: Cigarette Use: Negative For current cigarette smoker. Alcohol: Rarely consumes  alcohol. Exercise Type: exercises sporadically. Reviewed, no changes. Date: 1/19/2021  Was the patient queried about smoking behavior? Yes   Does the patient currently smoke? Smoking: Patient is a current smoker, smokes some days -  CIGARS. Current Outpatient Medications:     valsartan-hydroCHLOROthiazide (DIOVAN-HCT) 320-25 MG per tablet, take 1 tablet by mouth once daily, Disp: 90 tablet, Rfl: 1    sertraline (ZOLOFT) 100 MG tablet, Take 2 tablets by mouth daily, Disp: 60 tablet, Rfl: 5    rosuvastatin (CRESTOR) 20 MG tablet, Take 1 tablet by mouth daily, Disp: 30 tablet, Rfl: 5    rOPINIRole (REQUIP) 1 MG tablet, take 1 tablet by mouth at bedtime, Disp: 30 tablet, Rfl: 5    metoprolol succinate (TOPROL XL) 50 MG extended release tablet, Take 1 tablet by mouth daily, Disp: 30 tablet, Rfl: 5    [START ON 3/1/2021] HYDROcodone-acetaminophen (LORTAB) 7.5-325 MG per tablet, Take 1 tablet by mouth every 6 hours as needed for Pain for up to 30 days. Fill on 6-9-20, Disp: 120 tablet, Rfl: 0    ALPRAZolam (XANAX) 0.25 MG tablet, Take 1 tablet by mouth daily for 30 days. , Disp: 90 tablet, Rfl: 0    sildenafil (VIAGRA) 50 MG tablet, take 1 tablet by mouth if needed for ERECTILE DYSFUNCTION, Disp: 15 tablet, Rfl: 1    naproxen (NAPROSYN) 500 MG tablet, Take 1 tablet by mouth 2 times daily as needed for Pain Take with food (Patient not taking: Reported on 1/19/2021), Disp: 14 tablet, Rfl: 0  No Known Allergies    Past Medical History:   Diagnosis Date    Anxiety     Carpal tunnel syndrome of right wrist     Cervical radiculopathy 04/11/2016    recurrent    Chronic pain     Erectile dysfunction     Hyperlipidemia     Hypertension     Hypogonadism in male 12/20/2017    Lumbar strain 12/13/2016    recurrent    Osteoarthritis     Panic disorder 07/23/2014    Restless legs syndrome     Sleep apnea  Tachycardia 07/23/2014     Past Surgical History:   Procedure Laterality Date    NECK SURGERY      ablation     Family History   Problem Relation Age of Onset    Mental Illness Father         anxiety    Coronary Art Dis Father      Social History     Socioeconomic History    Marital status:      Spouse name: Not on file    Number of children: Not on file    Years of education: Not on file    Highest education level: Not on file   Occupational History    Not on file   Social Needs    Financial resource strain: Not on file    Food insecurity     Worry: Not on file     Inability: Not on file    Transportation needs     Medical: Not on file     Non-medical: Not on file   Tobacco Use    Smoking status: Never Smoker    Smokeless tobacco: Never Used   Substance and Sexual Activity    Alcohol use: Yes     Comment: rarely consumes    Drug use: No    Sexual activity: Yes   Lifestyle    Physical activity     Days per week: Not on file     Minutes per session: Not on file    Stress: Not on file   Relationships    Social connections     Talks on phone: Not on file     Gets together: Not on file     Attends Evangelical service: Not on file     Active member of club or organization: Not on file     Attends meetings of clubs or organizations: Not on file     Relationship status: Not on file    Intimate partner violence     Fear of current or ex partner: Not on file     Emotionally abused: Not on file     Physically abused: Not on file     Forced sexual activity: Not on file   Other Topics Concern    Not on file   Social History Narrative    Not on file       Vitals:    01/19/21 1620   BP: 120/60   Pulse: 110   Resp: 16   Temp: 98.1 °F (36.7 °C)   SpO2: 95%   Weight: (!) 308 lb (139.7 kg)       Physical Exam  Objective    Exam:  Const: Appears healthy,well developed and well nourished. Appears obese. Eyes: EOMI in both eyes. PERRL. ENMT: External canals are clear and dry. Tympanic membranes are intact. External nose WNL. Neck: Supple and symmetric. Palpation reveals no adenopathy. No masses appreciated. Thyroid  exhibits no nodules or thyromegaly. No JVD. Resp: Respirations are unlabored. Respiration rate is normal. Auscultate good airflow. No rales,  rhonchi or wheezes appreciated over the lungs bilaterally. CV: Rhythm is regular. S1 is normal. S2 is normal. Grade 2/6, systolic murmur. Carotids: no  bruits. Abdominal aorta is not palpable. Pedal pulses: 2+ and equal bilaterally. Extremities: No clubbing, cyanosis or edema. Abdomen: Bowel sounds are normoactive. Palpation of the abdomen reveals softness, but no  distension, organomegaly or tenderness. No abdominal masses. No palpable hepatosplenomegaly. Musculo: Walks with a normal gait. Upper Extremities: Full ROM bilaterally. Lower Extremities:  Crepitation of the lower extremities. Skin: No evidence of stasis dermatitis of the lower extremity  Neuro: Alert and oriented x3. Mood is normal. Affect is normal. Speech is articulate and fluent. Reflexes: DTR's are intact, symmetric and 2+ bilaterally, Tinel sign is negative but Phalen's sign is  positive on the right. Psych: Patient's attitude is cooperative. Patient's affect is appropriate. Judgement is realistic. Insight  is appropriate. Diagnoses and all orders for this visit:    Essential hypertension  -     Lipid Panel; Future    MAURICIO (generalized anxiety disorder)  -     sertraline (ZOLOFT) 100 MG tablet; Take 2 tablets by mouth daily  -     CBC Auto Differential; Future  -     Comprehensive Metabolic Panel; Future    Mixed hyperlipidemia  -     rosuvastatin (CRESTOR) 20 MG tablet; Take 1 tablet by mouth daily    Restless leg  -     rOPINIRole (REQUIP) 1 MG tablet; take 1 tablet by mouth at bedtime  -     CBC Auto Differential; Future    Tachycardia  -     metoprolol succinate (TOPROL XL) 50 MG extended release tablet; Take 1 tablet by mouth daily  -     TSH without Reflex;  Future    Other chronic pain -     Discontinue: HYDROcodone-acetaminophen (LORTAB) 7.5-325 MG per tablet; Take 1 tablet by mouth every 6 hours as needed for Pain for up to 30 days. Fill on 6-9-20  -     HYDROcodone-acetaminophen (LORTAB) 7.5-325 MG per tablet; Take 1 tablet by mouth every 6 hours as needed for Pain for up to 30 days. Fill on 6-9-20    Hypogonadism in male  -     PSA SCREENING; Future  -     Testosterone, free, total; Future    Other orders  -     valsartan-hydroCHLOROthiazide (DIOVAN-HCT) 320-25 MG per tablet; take 1 tablet by mouth once daily    OARRS report is verified and is appropriate. Patient will have a work-up for possible hypogonadism. He is to be seen back in 3 months to reassess. Of continued urged him for dietary measures to lose weight. Increase activity as tolerated.

## 2021-02-10 DIAGNOSIS — N52.01 ERECTILE DYSFUNCTION DUE TO ARTERIAL INSUFFICIENCY: ICD-10-CM

## 2021-02-10 RX ORDER — SILDENAFIL 100 MG/1
TABLET, FILM COATED ORAL
Qty: 6 TABLET | Refills: 1 | Status: SHIPPED
Start: 2021-02-10 | End: 2021-11-15

## 2021-02-10 NOTE — TELEPHONE ENCOUNTER
**PLEASE REVIEW MED BEFORE SENDING**    Patient originally prescribed 50mg, 15 tablets with 1 refill.  Due to insurance patient requested 6 100mg tablets that he can cut in half      Last Appointment:  1/19/2021  Future Appointments   Date Time Provider Channing Larry   4/20/2021  4:00 PM Onel Keller  [facility] [address]

## 2021-03-08 DIAGNOSIS — E29.1 HYPOGONADISM IN MALE: Primary | ICD-10-CM

## 2021-03-11 DIAGNOSIS — E29.1 HYPOGONADISM IN MALE: ICD-10-CM

## 2021-03-16 LAB
SEX HORMONE BINDING GLOBULIN: 23 NMOL/L (ref 11–80)
TESTOSTERONE FREE-NONMALE: 38.9 PG/ML (ref 47–244)
TESTOSTERONE TOTAL: 170 NG/DL (ref 220–1000)

## 2021-04-20 ENCOUNTER — OFFICE VISIT (OUTPATIENT)
Dept: FAMILY MEDICINE CLINIC | Age: 56
End: 2021-04-20
Payer: COMMERCIAL

## 2021-04-20 VITALS
HEART RATE: 113 BPM | DIASTOLIC BLOOD PRESSURE: 80 MMHG | TEMPERATURE: 97.9 F | BODY MASS INDEX: 39.8 KG/M2 | WEIGHT: 310 LBS | OXYGEN SATURATION: 94 % | SYSTOLIC BLOOD PRESSURE: 146 MMHG

## 2021-04-20 DIAGNOSIS — E78.2 MIXED HYPERLIPIDEMIA: ICD-10-CM

## 2021-04-20 DIAGNOSIS — R00.0 TACHYCARDIA: ICD-10-CM

## 2021-04-20 DIAGNOSIS — F41.1 GAD (GENERALIZED ANXIETY DISORDER): ICD-10-CM

## 2021-04-20 DIAGNOSIS — G25.81 RESTLESS LEG: ICD-10-CM

## 2021-04-20 DIAGNOSIS — G89.29 OTHER CHRONIC PAIN: Primary | ICD-10-CM

## 2021-04-20 DIAGNOSIS — F41.9 ANXIETY: ICD-10-CM

## 2021-04-20 PROCEDURE — 1036F TOBACCO NON-USER: CPT | Performed by: INTERNAL MEDICINE

## 2021-04-20 PROCEDURE — G8417 CALC BMI ABV UP PARAM F/U: HCPCS | Performed by: INTERNAL MEDICINE

## 2021-04-20 PROCEDURE — 99214 OFFICE O/P EST MOD 30 MIN: CPT | Performed by: INTERNAL MEDICINE

## 2021-04-20 PROCEDURE — 3017F COLORECTAL CA SCREEN DOC REV: CPT | Performed by: INTERNAL MEDICINE

## 2021-04-20 PROCEDURE — G8427 DOCREV CUR MEDS BY ELIG CLIN: HCPCS | Performed by: INTERNAL MEDICINE

## 2021-04-20 RX ORDER — HYDROCODONE BITARTRATE AND ACETAMINOPHEN 7.5; 325 MG/1; MG/1
1 TABLET ORAL EVERY 6 HOURS PRN
Qty: 120 TABLET | Refills: 0 | Status: SHIPPED | OUTPATIENT
Start: 2021-06-20 | End: 2021-06-01 | Stop reason: SDUPTHER

## 2021-04-20 RX ORDER — ALPRAZOLAM 0.25 MG/1
0.25 TABLET ORAL DAILY
Qty: 90 TABLET | Refills: 1 | Status: SHIPPED | OUTPATIENT
Start: 2021-05-20 | End: 2021-05-24

## 2021-04-20 RX ORDER — ROPINIROLE 1 MG/1
TABLET, FILM COATED ORAL
Qty: 30 TABLET | Refills: 5 | Status: SHIPPED | OUTPATIENT
Start: 2021-04-20 | End: 2021-08-18 | Stop reason: SDUPTHER

## 2021-04-20 RX ORDER — METOPROLOL SUCCINATE 50 MG/1
50 TABLET, EXTENDED RELEASE ORAL DAILY
Qty: 30 TABLET | Refills: 5 | Status: SHIPPED | OUTPATIENT
Start: 2021-04-20 | End: 2022-02-22 | Stop reason: SDUPTHER

## 2021-04-20 RX ORDER — ROSUVASTATIN CALCIUM 20 MG/1
20 TABLET, COATED ORAL DAILY
Qty: 30 TABLET | Refills: 5 | Status: SHIPPED | OUTPATIENT
Start: 2021-04-20 | End: 2021-08-18 | Stop reason: SDUPTHER

## 2021-04-20 RX ORDER — HYDROCODONE BITARTRATE AND ACETAMINOPHEN 7.5; 325 MG/1; MG/1
1 TABLET ORAL EVERY 6 HOURS PRN
Qty: 120 TABLET | Refills: 0 | Status: SHIPPED | OUTPATIENT
Start: 2021-04-20 | End: 2021-04-20 | Stop reason: SDUPTHER

## 2021-04-20 RX ORDER — SERTRALINE HYDROCHLORIDE 100 MG/1
200 TABLET, FILM COATED ORAL DAILY
Qty: 60 TABLET | Refills: 5 | Status: SHIPPED | OUTPATIENT
Start: 2021-04-20 | End: 2022-05-27

## 2021-04-20 ASSESSMENT — PATIENT HEALTH QUESTIONNAIRE - PHQ9
2. FEELING DOWN, DEPRESSED OR HOPELESS: 0
SUM OF ALL RESPONSES TO PHQ QUESTIONS 1-9: 0
SUM OF ALL RESPONSES TO PHQ9 QUESTIONS 1 & 2: 0
SUM OF ALL RESPONSES TO PHQ QUESTIONS 1-9: 0

## 2021-04-20 NOTE — PROGRESS NOTES
19  Carlo Parmar : 1965 Sex: male  Age: 60 y.o. Chief Complaint   Patient presents with    Other     reassess pain meds       Patient started a new job in the fall that the [employer]. He is much less stressed but unfortunately with out stress the patient eats a lot more and is gained a lot of weight. The patient is chronically using narcotic analgesics but he is backing down from these and I am very pleased with that. Unfortunately the patient has not taken his blood pressure medicine for the last 3 days because he ran out. I will renew that medication. His blood pressure is elevated today. Patient denies any palpitations or tachycardia. He denies any chest pain or chest pressure. His pain especially of his cervical spine and lower back seems to be relatively stable and he is able to get away with taking no more than 2 narcotic analgesics a day. Hyperlipidemia    Other        Review of Systems  Health Maintenance:  Colonoscopy Screening - (2015)-referred back 10/20/2020  Influenza Vaccination - (10/20/2020) REFUSES  Physical Exam - (10/20/2020)  Prostate Exam - (10/20/2020)  Psa Test - (10/20/2020)  Rectal Exam - (10/20/2020)  Colonoscopy - (2015) KOLOZSI-NEXT 2021  Medical Problems:  Hypertension, Hypercholesterolemia, Osteoarthritis  Panic Disorder - (2014) RESTARTED ZOLOFT  Anxiety  Decreased Libido - (8/10/2012) EVALUATED BY DARREN  Cervical Radiculopathy - (2017) RECURRENT  Lumbar Strain - (2016) RECURRENT  Tachycardia - (2014) RESTARTED TOPROL  Hypogonadism - (2017) REPEAT [facility] work-up underway 2019  Restless Leg Syndrome - (2018) TRIAL REQUIP-increase Requip 2019  Carpal Tunnel Syndrome - (2018) RIGHT-EMG ORDERED  Right Knee Pain - (2018) ? ? OA  Obesity longstanding  Reviewed, no changes. FH:  Father:  Coronary Artery Disease (CAD). Mother:  . (Hx)  Reviewed, no changes.   SH:  Marital: . Personal Habits: Cigarette Use: Negative For current cigarette smoker. Alcohol: Rarely consumes  alcohol. Exercise Type: exercises sporadically. Reviewed, no changes. Date: 1/19/2021  Was the patient queried about smoking behavior? Yes   Does the patient currently smoke? Smoking: Patient is a current smoker, smokes some days -  CIGARS. Current Outpatient Medications:     metoprolol succinate (TOPROL XL) 50 MG extended release tablet, Take 1 tablet by mouth daily, Disp: 30 tablet, Rfl: 5    rOPINIRole (REQUIP) 1 MG tablet, take 1 tablet by mouth at bedtime, Disp: 30 tablet, Rfl: 5    rosuvastatin (CRESTOR) 20 MG tablet, Take 1 tablet by mouth daily, Disp: 30 tablet, Rfl: 5    sertraline (ZOLOFT) 100 MG tablet, Take 2 tablets by mouth daily, Disp: 60 tablet, Rfl: 5    [START ON 5/20/2021] ALPRAZolam (XANAX) 0.25 MG tablet, Take 1 tablet by mouth daily for 30 days. , Disp: 90 tablet, Rfl: 1    [START ON 6/20/2021] HYDROcodone-acetaminophen (LORTAB) 7.5-325 MG per tablet, Take 1 tablet by mouth every 6 hours as needed for Pain for up to 30 days.  Fill on 6-9-20, Disp: 120 tablet, Rfl: 0    valsartan-hydroCHLOROthiazide (DIOVAN-HCT) 320-25 MG per tablet, take 1 tablet by mouth once daily, Disp: 30 tablet, Rfl: 5    sildenafil (VIAGRA) 100 MG tablet, take 1 tablet by mouth if needed for ERECTILE DYSFUNCTION, Disp: 6 tablet, Rfl: 1    naproxen (NAPROSYN) 500 MG tablet, Take 1 tablet by mouth 2 times daily as needed for Pain Take with food (Patient not taking: Reported on 1/19/2021), Disp: 14 tablet, Rfl: 0  No Known Allergies    Past Medical History:   Diagnosis Date    Anxiety     Carpal tunnel syndrome of right wrist     Cervical radiculopathy 04/11/2016    recurrent    Chronic pain     Erectile dysfunction     Hyperlipidemia     Hypertension     Hypogonadism in male 12/20/2017    Lumbar strain 12/13/2016    recurrent    Osteoarthritis     Panic disorder 07/23/2014    Restless legs syndrome     Sleep apnea     Tachycardia 07/23/2014     Past Surgical History:   Procedure Laterality Date    NECK SURGERY      ablation     Family History   Problem Relation Age of Onset    Mental Illness Father         anxiety    Coronary Art Dis Father      Social History     Socioeconomic History    Marital status:      Spouse name: Not on file    Number of children: Not on file    Years of education: Not on file    Highest education level: Not on file   Occupational History    Not on file   Social Needs    Financial resource strain: Not on file    Food insecurity     Worry: Not on file     Inability: Not on file    Transportation needs     Medical: Not on file     Non-medical: Not on file   Tobacco Use    Smoking status: Never Smoker    Smokeless tobacco: Never Used   Substance and Sexual Activity    Alcohol use: Yes     Comment: rarely consumes    Drug use: No    Sexual activity: Yes   Lifestyle    Physical activity     Days per week: Not on file     Minutes per session: Not on file    Stress: Not on file   Relationships    Social connections     Talks on phone: Not on file     Gets together: Not on file     Attends Yazdanism service: Not on file     Active member of club or organization: Not on file     Attends meetings of clubs or organizations: Not on file     Relationship status: Not on file    Intimate partner violence     Fear of current or ex partner: Not on file     Emotionally abused: Not on file     Physically abused: Not on file     Forced sexual activity: Not on file   Other Topics Concern    Not on file   Social History Narrative    Not on file       Vitals:    04/20/21 1616   BP: (!) 150/100   Pulse: 113   Temp: 97.9 °F (36.6 °C)   SpO2: 94%   Weight: (!) 310 lb (140.6 kg)       Physical Exam  Objective    Exam:  Const: Appears healthy,well developed and well nourished. Appears severely obese. Eyes: EOMI in both eyes. PERRL.   ENMT: External canals are clear and dry. Tympanic membranes are intact. External nose WNL. Neck: Supple and symmetric. Palpation reveals no adenopathy. No masses appreciated. Thyroid  exhibits no nodules or thyromegaly. No JVD. Resp: Respirations are unlabored. Respiration rate is normal. Auscultate good airflow. No rales,  rhonchi or wheezes appreciated over the lungs bilaterally. CV: Rhythm is regular. S1 is normal. S2 is normal. Grade 2/6, systolic murmur. Carotids: no  bruits. Abdominal aorta is not palpable. Pedal pulses: 2+ and equal bilaterally. Extremities: No clubbing, cyanosis or edema. Abdomen: Bowel sounds are normoactive. Palpation of the abdomen reveals softness, but no  distension, organomegaly or tenderness. No abdominal masses. No palpable hepatosplenomegaly. Musculo: Walks with a normal gait. Upper Extremities: Full ROM bilaterally. Lower Extremities:  Crepitation of the lower extremities. Skin: No evidence of stasis dermatitis of the lower extremity  Neuro: Alert and oriented x3. Mood is normal. Affect is normal. Speech is articulate and fluent. Reflexes: DTR's are intact, symmetric and 2+ bilaterally, Tinel sign is negative but Phalen's sign is  positive on the right. Psych: Patient's attitude is cooperative. Patient's affect is appropriate. Judgement is realistic. Insight  is appropriate. Arvind Marcum was seen today for other. Diagnoses and all orders for this visit:    Other chronic pain  -     Discontinue: HYDROcodone-acetaminophen (LORTAB) 7.5-325 MG per tablet; Take 1 tablet by mouth every 6 hours as needed for Pain for up to 30 days. Fill on 6-9-20  -     HYDROcodone-acetaminophen (LORTAB) 7.5-325 MG per tablet; Take 1 tablet by mouth every 6 hours as needed for Pain for up to 30 days. Fill on 6-9-20    Tachycardia  -     metoprolol succinate (TOPROL XL) 50 MG extended release tablet; Take 1 tablet by mouth daily  -     TSH without Reflex; Future  -     Lipid Panel;  Future  -     CBC Auto Differential; Future  - Comprehensive Metabolic Panel; Future    Restless leg  -     rOPINIRole (REQUIP) 1 MG tablet; take 1 tablet by mouth at bedtime    Mixed hyperlipidemia  -     rosuvastatin (CRESTOR) 20 MG tablet; Take 1 tablet by mouth daily  -     TSH without Reflex; Future  -     Lipid Panel; Future  -     CBC Auto Differential; Future  -     Comprehensive Metabolic Panel; Future    MAURICIO (generalized anxiety disorder)  -     sertraline (ZOLOFT) 100 MG tablet; Take 2 tablets by mouth daily    Anxiety  -     ALPRAZolam (XANAX) 0.25 MG tablet; Take 1 tablet by mouth daily for 30 days.  -     TSH without Reflex; Future  -     Lipid Panel; Future  -     CBC Auto Differential; Future  -     Comprehensive Metabolic Panel; Future    [facility] report is verified and is appropriate. Patient is to be seen back in 4 months. Lab work will be obtained today. For some reason this was not obtained back in January. He is up-to-date in terms of his prostate exam.  My current note indicates that the patient should be having repeat colonoscopy in 2021. We will need to make sure that is completed. Patient does not wish Covid vaccination. I did discuss this with him.

## 2021-06-01 ENCOUNTER — TELEPHONE (OUTPATIENT)
Dept: FAMILY MEDICINE CLINIC | Age: 56
End: 2021-06-01

## 2021-06-01 DIAGNOSIS — G89.29 OTHER CHRONIC PAIN: ICD-10-CM

## 2021-06-01 RX ORDER — HYDROCODONE BITARTRATE AND ACETAMINOPHEN 7.5; 325 MG/1; MG/1
1 TABLET ORAL EVERY 6 HOURS PRN
Qty: 40 TABLET | Refills: 0 | Status: SHIPPED | OUTPATIENT
Start: 2021-06-20 | End: 2022-11-24

## 2021-08-18 ENCOUNTER — OFFICE VISIT (OUTPATIENT)
Dept: FAMILY MEDICINE CLINIC | Age: 56
End: 2021-08-18
Payer: COMMERCIAL

## 2021-08-18 VITALS
WEIGHT: 315 LBS | HEART RATE: 107 BPM | DIASTOLIC BLOOD PRESSURE: 120 MMHG | TEMPERATURE: 98 F | OXYGEN SATURATION: 95 % | SYSTOLIC BLOOD PRESSURE: 160 MMHG | BODY MASS INDEX: 40.83 KG/M2

## 2021-08-18 DIAGNOSIS — E78.2 MIXED HYPERLIPIDEMIA: ICD-10-CM

## 2021-08-18 DIAGNOSIS — I10 ESSENTIAL HYPERTENSION: ICD-10-CM

## 2021-08-18 DIAGNOSIS — E29.1 HYPOGONADISM IN MALE: Primary | ICD-10-CM

## 2021-08-18 DIAGNOSIS — N52.01 ERECTILE DYSFUNCTION DUE TO ARTERIAL INSUFFICIENCY: ICD-10-CM

## 2021-08-18 DIAGNOSIS — M15.9 PRIMARY OSTEOARTHRITIS INVOLVING MULTIPLE JOINTS: ICD-10-CM

## 2021-08-18 DIAGNOSIS — F41.9 ANXIETY: ICD-10-CM

## 2021-08-18 DIAGNOSIS — G89.29 OTHER CHRONIC PAIN: ICD-10-CM

## 2021-08-18 DIAGNOSIS — G25.81 RESTLESS LEG: ICD-10-CM

## 2021-08-18 PROCEDURE — G8427 DOCREV CUR MEDS BY ELIG CLIN: HCPCS | Performed by: INTERNAL MEDICINE

## 2021-08-18 PROCEDURE — 1036F TOBACCO NON-USER: CPT | Performed by: INTERNAL MEDICINE

## 2021-08-18 PROCEDURE — 3017F COLORECTAL CA SCREEN DOC REV: CPT | Performed by: INTERNAL MEDICINE

## 2021-08-18 PROCEDURE — G8417 CALC BMI ABV UP PARAM F/U: HCPCS | Performed by: INTERNAL MEDICINE

## 2021-08-18 PROCEDURE — 99214 OFFICE O/P EST MOD 30 MIN: CPT | Performed by: INTERNAL MEDICINE

## 2021-08-18 RX ORDER — VALSARTAN AND HYDROCHLOROTHIAZIDE 320; 25 MG/1; MG/1
TABLET, FILM COATED ORAL
Qty: 30 TABLET | Refills: 5 | Status: SHIPPED
Start: 2021-08-18 | End: 2022-02-22 | Stop reason: SDUPTHER

## 2021-08-18 RX ORDER — ALPRAZOLAM 0.25 MG/1
TABLET ORAL
Qty: 90 TABLET | Refills: 0 | Status: SHIPPED | OUTPATIENT
Start: 2021-08-18 | End: 2021-09-18

## 2021-08-18 RX ORDER — ROSUVASTATIN CALCIUM 20 MG/1
20 TABLET, COATED ORAL DAILY
Qty: 30 TABLET | Refills: 5 | Status: SHIPPED
Start: 2021-08-18 | End: 2022-08-24 | Stop reason: SDUPTHER

## 2021-08-18 RX ORDER — SILDENAFIL 50 MG/1
50 TABLET, FILM COATED ORAL DAILY PRN
Qty: 20 TABLET | Refills: 0 | Status: SHIPPED | OUTPATIENT
Start: 2021-08-18 | End: 2022-02-22 | Stop reason: SDUPTHER

## 2021-08-18 RX ORDER — ROPINIROLE 1 MG/1
TABLET, FILM COATED ORAL
Qty: 30 TABLET | Refills: 5 | Status: SHIPPED
Start: 2021-08-18 | End: 2022-02-22 | Stop reason: SDUPTHER

## 2021-08-18 RX ORDER — HYDROCODONE BITARTRATE AND ACETAMINOPHEN 7.5; 325 MG/1; MG/1
1 TABLET ORAL EVERY 6 HOURS PRN
Qty: 120 TABLET | Refills: 0 | Status: SHIPPED | OUTPATIENT
Start: 2021-08-18 | End: 2021-10-23 | Stop reason: SDUPTHER

## 2021-08-18 NOTE — PROGRESS NOTES
19  Carlo Parmar : 1965 Sex: male  Age: 60 y.o. Chief Complaint   Patient presents with    Other     4 months       Patient's been out of his blood pressure medicines for 4 days. Blood pressure is elevated today. Patient thankfully is nonsymptomatic. He denies chest pain, chest pressure, shortness of breath. Patient continues to gain weight. We discussed weight reduction methods. The patient needs to calorie restrict. He also needs to change lifestyle and include exercise as part of his daily regimen. The patient is complaining of erectile dysfunction. This is probably multifactorial including low testosterone levels. Patient was able to take himself off for the most part from his narcotic analgesics for almost a month and a half. He then had an acute flare of his back problem and went back on the medication. I will give him a limited supply of the medication but I would like him to minimize its use as much as possible. Patient and I did discuss the erectile dysfunction and I will refer him to  for follow-up. Patient was supposed to get a colonoscopy but this was not arranged through gastroenterology. I put a call out to gastroenterology regarding this.     Other    Hyperlipidemia        Review of Systems  Health Maintenance:  Colonoscopy Screening - (2015)-referred back 10/20/2020  Influenza Vaccination - (10/20/2020) REFUSES  Physical Exam - (10/20/2020)  Prostate Exam - (10/20/2020)  Psa Test - (10/20/2020)  Rectal Exam - (10/20/2020)  Colonoscopy - (2015) KOLOZSI-NEXT 2021  Medical Problems:  Hypertension, Hypercholesterolemia, Osteoarthritis  Panic Disorder - (2014) RESTARTED ZOLOFT  Anxiety  Decreased Libido - (8/10/2012) EVALUATED BY DARREN  Cervical Radiculopathy - (2017) RECURRENT  Lumbar Strain - (2016) RECURRENT  Tachycardia - (2014) RESTARTED TOPROL  Hypogonadism - (2017) REPEAT [facility] work-up underway  2019  Restless Leg Syndrome - (6/19/2018) TRIAL REQUIP-increase Requip June 25, 2019  Carpal Tunnel Syndrome - (12/21/2018) RIGHT-EMG ORDERED  Right Knee Pain - (12/21/2018) ? ? OA  Obesity longstanding  Reviewed, no changes. FH:  Father:  Coronary Artery Disease (CAD). Mother:  . (Hx)  Reviewed, no changes. SH:  Marital: . Personal Habits: Cigarette Use: Negative For current cigarette smoker. Alcohol: Rarely consumes  alcohol. Exercise Type: exercises sporadically. Reviewed, no changes. Date: 1/19/2021  Was the patient queried about smoking behavior? Yes   Does the patient currently smoke? Smoking: Patient is a current smoker, smokes some days -  CIGARS.     Current Outpatient Medications:     ALPRAZolam (XANAX) 0.25 MG tablet, take 1 tablet by mouth once daily if needed, Disp: 90 tablet, Rfl: 0    metoprolol succinate (TOPROL XL) 50 MG extended release tablet, Take 1 tablet by mouth daily, Disp: 30 tablet, Rfl: 5    rOPINIRole (REQUIP) 1 MG tablet, take 1 tablet by mouth at bedtime, Disp: 30 tablet, Rfl: 5    rosuvastatin (CRESTOR) 20 MG tablet, Take 1 tablet by mouth daily, Disp: 30 tablet, Rfl: 5    sertraline (ZOLOFT) 100 MG tablet, Take 2 tablets by mouth daily, Disp: 60 tablet, Rfl: 5    valsartan-hydroCHLOROthiazide (DIOVAN-HCT) 320-25 MG per tablet, take 1 tablet by mouth once daily, Disp: 30 tablet, Rfl: 5    sildenafil (VIAGRA) 100 MG tablet, take 1 tablet by mouth if needed for ERECTILE DYSFUNCTION, Disp: 6 tablet, Rfl: 1    naproxen (NAPROSYN) 500 MG tablet, Take 1 tablet by mouth 2 times daily as needed for Pain Take with food (Patient not taking: Reported on 1/19/2021), Disp: 14 tablet, Rfl: 0  No Known Allergies    Past Medical History:   Diagnosis Date    Anxiety     Carpal tunnel syndrome of right wrist     Cervical radiculopathy 04/11/2016    recurrent    Chronic pain     Erectile dysfunction     Hyperlipidemia     Hypertension     Hypogonadism in male 12/20/2017    Lumbar strain 12/13/2016    recurrent    Osteoarthritis     Panic disorder 07/23/2014    Restless legs syndrome     Sleep apnea     Tachycardia 07/23/2014     Past Surgical History:   Procedure Laterality Date    NECK SURGERY      ablation     Family History   Problem Relation Age of Onset    Mental Illness Father         anxiety    Coronary Art Dis Father      Social History     Socioeconomic History    Marital status:      Spouse name: Not on file    Number of children: Not on file    Years of education: Not on file    Highest education level: Not on file   Occupational History    Not on file   Tobacco Use    Smoking status: Never Smoker    Smokeless tobacco: Never Used   Substance and Sexual Activity    Alcohol use: Yes     Comment: rarely consumes    Drug use: No    Sexual activity: Yes   Other Topics Concern    Not on file   Social History Narrative    Not on file     Social Determinants of Health     Financial Resource Strain:     Difficulty of Paying Living Expenses:    Food Insecurity:     Worried About Running Out of Food in the Last Year:     Ran Out of Food in the Last Year:    Transportation Needs:     Lack of Transportation (Medical):      Lack of Transportation (Non-Medical):    Physical Activity:     Days of Exercise per Week:     Minutes of Exercise per Session:    Stress:     Feeling of Stress :    Social Connections:     Frequency of Communication with Friends and Family:     Frequency of Social Gatherings with Friends and Family:     Attends Adventism Services:     Active Member of Clubs or Organizations:     Attends Club or Organization Meetings:     Marital Status:    Intimate Partner Violence:     Fear of Current or Ex-Partner:     Emotionally Abused:     Physically Abused:     Sexually Abused:        Vitals:    08/18/21 1434   BP: (!) 160/120   Pulse: 107   Temp: 98 °F (36.7 °C)   SpO2: 95%   Weight: (!) 318 lb (144.2 kg)       Physical Exam  Objective    Exam:  Const: Appears healthy,well developed and well nourished. Appears severely obese. Eyes: EOMI in both eyes. PERRL. ENMT: External canals are clear and dry. Tympanic membranes are intact. External nose WNL. Neck: Supple and symmetric. Palpation reveals no adenopathy. No masses appreciated. Thyroid  exhibits no nodules or thyromegaly. No JVD. Resp: Respirations are unlabored. Respiration rate is normal. Auscultate good airflow. No rales,  rhonchi or wheezes appreciated over the lungs bilaterally. CV: Rhythm is regular. S1 is normal. S2 is normal. Grade 2/6, systolic murmur. Carotids: no  bruits. Abdominal aorta is not palpable. Pedal pulses: 2+ and equal bilaterally. Extremities: No clubbing, cyanosis or edema. Abdomen: Bowel sounds are normoactive. Palpation of the abdomen reveals softness, but no  distension, organomegaly or tenderness. No abdominal masses. No palpable hepatosplenomegaly. Musculo: Walks with a normal gait. Upper Extremities: Full ROM bilaterally. Lower Extremities:  Crepitation of the lower extremities. Skin: No evidence of stasis dermatitis of the lower extremity  Neuro: Alert and oriented x3. Mood is normal. Affect is normal. Speech is articulate and fluent. Reflexes: DTR's are intact, symmetric and 2+ bilaterally, Tinel sign is negative but Phalen's sign is  positive on the right. Psych: Patient's attitude is cooperative. Patient's affect is appropriate. Judgement is realistic. Insight  is appropriate. Madi Marcos was seen today for other. Diagnoses and all orders for this visit:    Hypogonadism in male  -     Melany Greenfield MD, Urology, [place] ([facility])    Restless leg  -     rOPINIRole (REQUIP) 1 MG tablet; take 1 tablet by mouth at bedtime    Mixed hyperlipidemia  -     rosuvastatin (CRESTOR) 20 MG tablet; Take 1 tablet by mouth daily  -     Lipid Panel;  Future    Anxiety  -     ALPRAZolam (XANAX) 0.25 MG tablet; take 1 tablet by mouth once daily if needed    Other chronic pain  -     HYDROcodone-acetaminophen (LORTAB) 7.5-325 MG per tablet; Take 1 tablet by mouth every 6 hours as needed for Pain for up to 30 days. Fill on 6-9-20    Erectile dysfunction due to arterial insufficiency  -     Rigoberto Krueger MD, Urology, [place] ([facility])    Essential hypertension  -     TSH without Reflex; Future  -     Lipid Panel; Future    Primary osteoarthritis involving multiple joints  -     Comprehensive Metabolic Panel; Future    Other orders  -     valsartan-hydroCHLOROthiazide (DIOVAN-HCT) 320-25 MG per tablet; take 1 tablet by mouth once daily  -     sildenafil (VIAGRA) 50 MG tablet; Take 1 tablet by mouth daily as needed for Erectile Dysfunction    The patient is to be seen back in 3 months. He is to go back on his blood pressure medication immediately. He states that he has been taking his blood pressures at home and they have been well controlled. I will send him to urology for further evaluation.   Lab work will be obtained today

## 2021-08-19 RX ORDER — SILDENAFIL 100 MG/1
TABLET, FILM COATED ORAL
Qty: 6 TABLET | Refills: 1 | OUTPATIENT
Start: 2021-08-19

## 2021-08-19 NOTE — TELEPHONE ENCOUNTER
Last Appointment:  4/20/2021  Future Appointments   Date Time Provider [place] Larry   11/24/2021  3:00 PM Mally Hahn  [address]

## 2021-10-23 DIAGNOSIS — G89.29 OTHER CHRONIC PAIN: ICD-10-CM

## 2021-10-23 RX ORDER — HYDROCODONE BITARTRATE AND ACETAMINOPHEN 7.5; 325 MG/1; MG/1
1 TABLET ORAL EVERY 6 HOURS PRN
Qty: 40 TABLET | Refills: 0 | Status: SHIPPED | OUTPATIENT
Start: 2021-10-23 | End: 2022-11-24

## 2021-11-13 DIAGNOSIS — N52.01 ERECTILE DYSFUNCTION DUE TO ARTERIAL INSUFFICIENCY: ICD-10-CM

## 2021-11-15 RX ORDER — SILDENAFIL 100 MG/1
TABLET, FILM COATED ORAL
Qty: 6 TABLET | Refills: 5 | Status: SHIPPED
Start: 2021-11-15 | End: 2021-11-24 | Stop reason: SDUPTHER

## 2021-11-15 NOTE — TELEPHONE ENCOUNTER
Last Appointment:  8/18/2021  Future Appointments   Date Time Provider Channing Larry   11/24/2021  3:00 PM Julee Boyd  [address] [place] [address]

## 2021-11-24 ENCOUNTER — VIRTUAL VISIT (OUTPATIENT)
Dept: FAMILY MEDICINE CLINIC | Age: 56
End: 2021-11-24
Payer: COMMERCIAL

## 2021-11-24 DIAGNOSIS — M15.9 PRIMARY OSTEOARTHRITIS INVOLVING MULTIPLE JOINTS: Primary | ICD-10-CM

## 2021-11-24 PROCEDURE — VIRTUALHLTH VIRTUAL HEALTH SAME DAY: Performed by: INTERNAL MEDICINE

## 2021-11-24 PROCEDURE — 99442 PR PHYS/QHP TELEPHONE EVALUATION 11-20 MIN: CPT | Performed by: INTERNAL MEDICINE

## 2021-11-24 RX ORDER — ALPRAZOLAM 0.25 MG/1
TABLET ORAL
COMMUNITY
Start: 2021-11-20 | End: 2022-01-07

## 2021-11-24 RX ORDER — HYDROCODONE BITARTRATE AND ACETAMINOPHEN 7.5; 325 MG/1; MG/1
1 TABLET ORAL EVERY 6 HOURS PRN
Qty: 120 TABLET | Refills: 0 | Status: SHIPPED
Start: 2021-11-24 | End: 2022-02-22 | Stop reason: SDUPTHER

## 2021-11-24 SDOH — ECONOMIC STABILITY: FOOD INSECURITY: WITHIN THE PAST 12 MONTHS, YOU WORRIED THAT YOUR FOOD WOULD RUN OUT BEFORE YOU GOT MONEY TO BUY MORE.: NEVER TRUE

## 2021-11-24 SDOH — ECONOMIC STABILITY: FOOD INSECURITY: WITHIN THE PAST 12 MONTHS, THE FOOD YOU BOUGHT JUST DIDN'T LAST AND YOU DIDN'T HAVE MONEY TO GET MORE.: NEVER TRUE

## 2021-11-24 SDOH — ECONOMIC STABILITY: TRANSPORTATION INSECURITY
IN THE PAST 12 MONTHS, HAS LACK OF TRANSPORTATION KEPT YOU FROM MEETINGS, WORK, OR FROM GETTING THINGS NEEDED FOR DAILY LIVING?: NO

## 2021-11-24 SDOH — ECONOMIC STABILITY: TRANSPORTATION INSECURITY
IN THE PAST 12 MONTHS, HAS THE LACK OF TRANSPORTATION KEPT YOU FROM MEDICAL APPOINTMENTS OR FROM GETTING MEDICATIONS?: NO

## 2021-11-24 ASSESSMENT — SOCIAL DETERMINANTS OF HEALTH (SDOH): HOW HARD IS IT FOR YOU TO PAY FOR THE VERY BASICS LIKE FOOD, HOUSING, MEDICAL CARE, AND HEATING?: NOT HARD AT ALL

## 2021-11-24 NOTE — PROGRESS NOTES
19  Carlo Parmar : 1965 Sex: male  Age: 60 y.o. Chief Complaint   Patient presents with    Telephone Appointment Visit     Selma zulma    Chronic Pain       This is a patient who initially was a video conference call but because of failed communication and chopping on and off this was converted to a telephone call. Patient's wife came down with Covid on Monday. The patient is now in quarantine. We did discuss monoclonal antibodies and testing if he would become positive. He does have high risk factors for significant Covid. His wife is feeling somewhat better. She initially had sore throat which then became more flulike symptoms. Patient is totally asymptomatic. Patient is currently under pain management. Oarrs report will be checked. Other    Hyperlipidemia        Review of Systems  Health Maintenance:  Colonoscopy Screening - (2015)-referred back 10/20/2020  Influenza Vaccination - (10/20/2020) REFUSES  Physical Exam - (10/20/2020)  Prostate Exam - (10/20/2020)  Psa Test - (10/20/2020)  Rectal Exam - (10/20/2020)  Colonoscopy - (2015) KOLOZSI-NEXT 2021  Medical Problems:  Hypertension, Hypercholesterolemia, Osteoarthritis  Panic Disorder - (2014) RESTARTED ZOLOFT  Anxiety  Decreased Libido - (8/10/2012) EVALUATED BY DARREN  Cervical Radiculopathy - (2017) RECURRENT  Lumbar Strain - (2016) RECURRENT  Tachycardia - (2014) RESTARTED TOPROL  Hypogonadism - (2017) REPEAT [place] work-up underway 2019  Restless Leg Syndrome - (2018) TRIAL REQUIP-increase Requip 2019  Carpal Tunnel Syndrome - (2018) RIGHT-EMG ORDERED  Right Knee Pain - (2018) ? ? OA  Obesity longstanding  Reviewed, no changes. FH:  Father:  Coronary Artery Disease (CAD). Mother:  . (Hx)  Reviewed, no changes. SH:  Marital: . Personal Habits: Cigarette Use: Negative For current cigarette smoker. Alcohol: Rarely consumes  alcohol. Exercise Type: mouth every 6 hours as needed for Pain for up to 30 days. Fill on 5/9/20, Disp: 120 tablet, Rfl: 0  No Known Allergies    Past Medical History:   Diagnosis Date    Anxiety     Carpal tunnel syndrome of right wrist     Cervical radiculopathy 04/11/2016    recurrent    Chronic pain     Erectile dysfunction     Hyperlipidemia     Hypertension     Hypogonadism in male 12/20/2017    Lumbar strain 12/13/2016    recurrent    Osteoarthritis     Panic disorder 07/23/2014    Restless legs syndrome     Sleep apnea     Tachycardia 07/23/2014     Past Surgical History:   Procedure Laterality Date    NECK SURGERY      ablation     Family History   Problem Relation Age of Onset    Mental Illness Father         anxiety    Coronary Art Dis Father      Social History     Socioeconomic History    Marital status:      Spouse name: Not on file    Number of children: Not on file    Years of education: Not on file    Highest education level: Not on file   Occupational History    Not on file   Tobacco Use    Smoking status: Never Smoker    Smokeless tobacco: Never Used   Substance and Sexual Activity    Alcohol use: Yes     Comment: rarely consumes    Drug use: No    Sexual activity: Yes   Other Topics Concern    Not on file   Social History Narrative    Not on file     Social Determinants of Health     Financial Resource Strain: Low Risk     Difficulty of Paying Living Expenses: Not hard at all   Food Insecurity: No Food Insecurity    Worried About Running Out of Food in the Last Year: Never true    Pablo of Food in the Last Year: Never true   Transportation Needs: No Transportation Needs    Lack of Transportation (Medical): No    Lack of Transportation (Non-Medical):  No   Physical Activity:     Days of Exercise per Week: Not on file    Minutes of Exercise per Session: Not on file   Stress:     Feeling of Stress : Not on file   Social Connections:     Frequency of Communication with Friends and Family: Not on file    Frequency of Social Gatherings with Friends and Family: Not on file    Attends [employer] Services: Not on file    Active Member of Clubs or Organizations: Not on file    Attends Club or Organization Meetings: Not on file    Marital Status: Not on file   Intimate Partner Violence:     Fear of Current or Ex-Partner: Not on file    Emotionally Abused: Not on file    Physically Abused: Not on file    Sexually Abused: Not on file   Housing Stability:     Unable to Pay for Housing in the Last Year: Not on file    Number of [place] in the Last Year: Not on file    Unstable Housing in the Last Year: Not on file       There were no vitals filed for this visit. Contreras Mckeon is a 60 y.o. male evaluated via telephone on 11/24/2021. Consent:  He and/or health care decision maker is aware that that he may receive a bill for this telephone service, depending on his insurance coverage, and has provided verbal consent to proceed: Yes      Documentation:  I communicated with the patient and/or health care decision maker about Covid exposure and pain control. Details of this discussion including any medical advice provided:       I affirm this is a Patient Initiated Episode with a Patient who has not had a related appointment within my department in the past 7 days or scheduled within the next 24 hours. Patient identification was verified at the start of the visit: Yes    Total Time: minutes: 11-20 minutes    The visit was conducted pursuant to the emergency declaration under the [address] [place] [address], [address] [place] [address] authority and the [employer] Act. Patient identification was verified, and a caregiver was present when appropriate. The patient was located in a state where the provider was credentialed to provide care. Prescription management was performed.   Counseling regarding Covid and monoclonal antibody infusion is given. Patient is to notify us immediately or [facility] if he does become symptomatic. He is to stay in quarantine until 10 days after his wife's symptoms became present.   Note: not billable if this call serves to triage the patient into an appointment for the relevant concern      Johnnie Hernandez MD

## 2021-11-26 ENCOUNTER — OFFICE VISIT (OUTPATIENT)
Dept: FAMILY MEDICINE CLINIC | Age: 56
End: 2021-11-26
Payer: COMMERCIAL

## 2021-11-26 VITALS
DIASTOLIC BLOOD PRESSURE: 70 MMHG | BODY MASS INDEX: 40.43 KG/M2 | WEIGHT: 315 LBS | OXYGEN SATURATION: 95 % | HEART RATE: 93 BPM | SYSTOLIC BLOOD PRESSURE: 130 MMHG | HEIGHT: 74 IN | RESPIRATION RATE: 16 BRPM | TEMPERATURE: 97.5 F

## 2021-11-26 DIAGNOSIS — U07.1 COVID-19: Primary | ICD-10-CM

## 2021-11-26 DIAGNOSIS — Z20.822 EXPOSURE TO COVID-19 VIRUS: ICD-10-CM

## 2021-11-26 LAB
Lab: ABNORMAL
PERFORMING INSTRUMENT: ABNORMAL
QC PASS/FAIL: ABNORMAL
SARS-COV-2, POC: DETECTED

## 2021-11-26 PROCEDURE — 87426 SARSCOV CORONAVIRUS AG IA: CPT | Performed by: NURSE PRACTITIONER

## 2021-11-26 PROCEDURE — 99213 OFFICE O/P EST LOW 20 MIN: CPT | Performed by: NURSE PRACTITIONER

## 2021-11-26 PROCEDURE — G8427 DOCREV CUR MEDS BY ELIG CLIN: HCPCS | Performed by: NURSE PRACTITIONER

## 2021-11-26 PROCEDURE — 3017F COLORECTAL CA SCREEN DOC REV: CPT | Performed by: NURSE PRACTITIONER

## 2021-11-26 PROCEDURE — G8417 CALC BMI ABV UP PARAM F/U: HCPCS | Performed by: NURSE PRACTITIONER

## 2021-11-26 PROCEDURE — G8484 FLU IMMUNIZE NO ADMIN: HCPCS | Performed by: NURSE PRACTITIONER

## 2021-11-26 PROCEDURE — 1036F TOBACCO NON-USER: CPT | Performed by: NURSE PRACTITIONER

## 2021-11-26 NOTE — PATIENT INSTRUCTIONS
Patient Education        Learning About Coronavirus [phone])  What is coronavirus (COVID-19)? COVID-19 is a disease caused by a type of coronavirus. This illness was first found in December 2019. It has since spread worldwide. Coronaviruses are a large group of viruses. They cause the common cold. They also cause more serious illnesses like Middle East respiratory syndrome (MERS) and severe acute respiratory syndrome (SARS). COVID-19 is caused by a novel coronavirus. That means it's a new type that has not been seen in people before. What are the symptoms? COVID-19 symptoms may include:  · Fever. · Cough. · Trouble breathing. · Chills or repeated shaking with chills. · Muscle and body aches. · Headache. · Sore throat. · New loss of taste or smell. · Vomiting. · Diarrhea. In severe cases, COVID-19 can cause pneumonia and make it hard to breathe without help from a machine. It can cause death. How is it diagnosed? COVID-19 is diagnosed with a viral test. This may also be called a PCR test or antigen test. It looks for evidence of the virus in your breathing passages or lungs (respiratory system). The test is most often done on a sample from the nose, throat, or lungs. It's sometimes done on a sample of saliva. One way a sample is collected is by putting a long swab into the back of your nose. How is it treated? Mild cases of COVID-19 can be treated at home. Serious cases need treatment in the hospital. Treatment may include medicines to reduce symptoms, plus breathing support such as oxygen therapy or a ventilator. Some people may be placed on their belly to help their oxygen levels. Treatments that may help people who have COVID-19 include:  Antiviral medicines. These medicines treat viral infections. Remdesivir is an example. Immune-based therapy. These medicines help the immune system fight COVID-19. Examples include monoclonal antibodies. Blood thinners.    These medicines help prevent blood clots. People with severe illness are at risk for blood clots. How can you protect yourself and others? The best way to protect yourself from getting sick is to:  · Get vaccinated. · Avoid sick people. · If you are not fully vaccinated:  ? Wear a mask if you have to go to public areas. ? Avoid crowds and try to stay at least 6 feet away from other people. · Cover your mouth with a tissue when you cough or sneeze. · Wash your hands often, especially after you cough or sneeze. Use soap and water, and scrub for at least 20 seconds. If soap and water aren't available, use an alcohol-based hand . · Avoid touching your mouth, nose, and eyes. To help avoid spreading the virus to others:  · Get vaccinated. · Cover your mouth with a tissue when you cough or sneeze. · Wash your hands often, especially after you cough or sneeze. Use soap and water, and scrub for at least 20 seconds. If soap and water aren't available, use an alcohol-based hand . · If you have been exposed to the virus and are not fully vaccinated:  ? Stay home. Don't go to school, work, or public areas. And don't use public transportation, ride-shares, or taxis unless you have no choice. ? Wear a mask if you have to go to public areas, like the pharmacy. · If you're sick:  ? Leave your home only if you need to get medical care. But call the doctor's office first so they know you're coming. And wear a mask. ? Wear a mask whenever you're around other people. ? Limit contact with pets and people in your home. If possible, stay in a separate bedroom and use a separate bathroom. ? Clean and disinfect your home every day. Use household  and disinfectant wipes or sprays. Take special care to clean things that you touch with your hands. How can you self-isolate when you have COVID-19? If you have COVID-19, there are things you can do to help avoid spreading the virus to others.   · Limit contact with people in your home. If possible, stay in a separate bedroom and use a separate bathroom. · Wear a mask when you are around other people. · If you have to leave home, avoid crowds and try to stay at least 6 feet away from other people. · Avoid contact with pets and other animals. · Cover your mouth and nose with a tissue when you cough or sneeze. Then throw it in the trash right away. · Wash your hands often, especially after you cough or sneeze. Use soap and water, and scrub for at least 20 seconds. If soap and water aren't available, use an alcohol-based hand . · Don't share personal household items. These include bedding, towels, cups and glasses, and eating utensils. · [address] in the warmest water allowed for the fabric type, and dry it completely. It's okay to wash other people's laundry with yours. · Clean and disinfect your home. Use household  and disinfectant wipes or sprays. When should you call for help? Call 911 anytime you think you may need emergency care. For example, call if you have life-threatening symptoms, such as:    · You have severe trouble breathing. (You can't talk at all.)     · You have constant chest pain or pressure.     · You are severely dizzy or lightheaded.     · You are confused or can't think clearly.     · You have pale, gray, or blue-colored skin or lips.     · You pass out (lose consciousness) or are very hard to wake up. Call your doctor now or seek immediate medical care if:    · You have moderate trouble breathing. (You can't speak a full sentence.)     · You are coughing up blood (more than about 1 teaspoon).     · You have signs of low blood pressure. These include feeling lightheaded; being too weak to stand; and having cold, pale, clammy skin.    Watch closely for changes in your health, and be sure to contact your doctor if:    · Your symptoms get worse.     · You are not getting better as expected.     · You have new or worse symptoms of anxiety, depression, nightmares, or flashbacks. Call before you go to the doctor's office. Follow their instructions. And wear a mask. Current as of: July 1, 2021               Content Version: 13.0  © 2006-2021 [employer]. Care instructions adapted under license by [facility] ([facility]). If you have questions about a medical condition or this instruction, always ask your healthcare professional. [place] [address] any warranty or liability for your use of this information.

## 2021-11-26 NOTE — PROGRESS NOTES
anxiety    Coronary Art Dis Father        Medications:     Current Outpatient Medications:     ALPRAZolam (XANAX) 0.25 MG tablet, , Disp: , Rfl:     HYDROcodone-acetaminophen (NORCO) 7.5-325 MG per tablet, Take 1 tablet by mouth every 6 hours as needed for Pain for up to 30 days. Intended supply: 30 days, Disp: 120 tablet, Rfl: 0    HYDROcodone-acetaminophen (LORTAB) 7.5-325 MG per tablet, Take 1 tablet by mouth every 6 hours as needed for Pain for up to 10 days. Fill on 6-9-20, Disp: 40 tablet, Rfl: 0    valsartan-hydroCHLOROthiazide (DIOVAN-HCT) 320-25 MG per tablet, take 1 tablet by mouth once daily, Disp: 30 tablet, Rfl: 5    rOPINIRole (REQUIP) 1 MG tablet, take 1 tablet by mouth at bedtime, Disp: 30 tablet, Rfl: 5    rosuvastatin (CRESTOR) 20 MG tablet, Take 1 tablet by mouth daily, Disp: 30 tablet, Rfl: 5    sildenafil (VIAGRA) 50 MG tablet, Take 1 tablet by mouth daily as needed for Erectile Dysfunction, Disp: 20 tablet, Rfl: 0    HYDROcodone-acetaminophen (LORTAB) 7.5-325 MG per tablet, Take 1 tablet by mouth every 6 hours as needed for Pain for up to 10 days. Fill on 6-9-20, Disp: 40 tablet, Rfl: 0    metoprolol succinate (TOPROL XL) 50 MG extended release tablet, Take 1 tablet by mouth daily, Disp: 30 tablet, Rfl: 5    HYDROcodone-acetaminophen (LORTAB) 7.5-325 MG per tablet, Take 1 tablet by mouth every 6 hours as needed for Pain for up to 30 days. Fill on 6-30-20, Disp: 120 tablet, Rfl: 0    HYDROcodone-acetaminophen (LORTAB) 7.5-325 MG per tablet, Take 1 tablet by mouth every 6 hours as needed for Pain for up to 30 days. Fill on 6-9-20, Disp: 120 tablet, Rfl: 0    HYDROcodone-acetaminophen (LORTAB) 7.5-325 MG per tablet, Take 1 tablet by mouth every 6 hours as needed for Pain for up to 30 days.  Fill on 5/9/20, Disp: 120 tablet, Rfl: 0    sertraline (ZOLOFT) 100 MG tablet, Take 2 tablets by mouth daily, Disp: 60 tablet, Rfl: 5    Allergies:   No Known Allergies    Social History: Social History     Tobacco Use    Smoking status: Never Smoker    Smokeless tobacco: Never Used   Substance Use Topics    Alcohol use: Yes     Comment: rarely consumes    Drug use: No       Patient lives at home. Physical Exam:     Vitals:    11/26/21 1033   BP: 130/70   Pulse: 93   Resp: 16   Temp: 97.5 °F (36.4 °C)   SpO2: 95%   Weight: (!) 322 lb (146.1 kg)   Height: 6' 2\" (1.88 m)       Physical Exam (PE)    Physical Exam  Constitutional:       Appearance: Normal appearance. HENT:      Head: Normocephalic. Right Ear: Tympanic membrane, ear canal and external ear normal.      Left Ear: Tympanic membrane, ear canal and external ear normal.      Nose: Rhinorrhea present. Mouth/Throat:      Mouth: Mucous membranes are moist.      Pharynx: Oropharynx is clear. Eyes:      Pupils: Pupils are equal, round, and reactive to light. Cardiovascular:      Rate and Rhythm: Normal rate and regular rhythm. Pulses: Normal pulses. Heart sounds: Normal heart sounds. Pulmonary:      Effort: Pulmonary effort is normal.      Breath sounds: Normal breath sounds. No wheezing, rhonchi or rales. Abdominal:      General: Bowel sounds are normal.      Palpations: Abdomen is soft. Musculoskeletal:         General: Normal range of motion. Cervical back: Normal range of motion and neck supple. Lymphadenopathy:      Cervical: No cervical adenopathy. Skin:     General: Skin is warm and dry. Capillary Refill: Capillary refill takes less than 2 seconds. Neurological:      General: No focal deficit present. Mental Status: He is alert and oriented to person, place, and time.    Psychiatric:         Mood and Affect: Mood normal.         Behavior: Behavior normal.          Testing:   (All laboratory and radiology results have been personally reviewed by myself)  Labs:  Results for orders placed or performed in visit on 11/26/21   POCT COVID-19, Antigen   Result Value Ref Range SARS-COV-2, POC Detected (A) Not Detected    Lot Number pass     QC Pass/Fail pass     Performing Instrument BD Veritor        Imaging: All Radiology results interpreted by Radiologist unless otherwise noted. No orders to display       Assessment / Plan:   The patient's vitals, allergies, medications, and past medical history have been reviewed. Fortino Kraft was seen today for fever, headache and chills. Diagnoses and all orders for this visit:    COVID-19    Exposure to COVID-19 virus  -     POCT COVID-19, Antigen        - Disposition: Home    - Educational material printed for patient's review and were included in patient instructions. After Visit Summary and given to patient at the end of visit. - COVID-19 antibody therapy infusion form completed and faxed to [address] [place] [address] cautionary self-quarantine at home per CDC guidelines. Encouraged oral fluids and rest. Discussed symptomatic treatments with patient today including Delsym prn cough and Tylenol prn for fever / pain. Schedule a follow-up with PCP in 2-3 days. Red flag symptoms were discussed with the patient today. The patient is directed to go the ED if symptoms change or worsen. Pt verbalizes understanding and is in agreement with plan of care. All questions answered. SIGNATURE: LOUIE Rizo    *NOTE: This report was transcribed using voice recognition software. Every effort was made to ensure accuracy; however, inadvertent computerized transcription errors may be present.

## 2022-01-06 DIAGNOSIS — F41.9 ANXIETY: Primary | ICD-10-CM

## 2022-01-07 DIAGNOSIS — F41.9 ANXIETY: ICD-10-CM

## 2022-01-07 RX ORDER — ALPRAZOLAM 0.25 MG/1
TABLET ORAL
Qty: 90 TABLET | Refills: 0 | Status: SHIPPED
Start: 2022-01-07 | End: 2022-10-05

## 2022-01-07 RX ORDER — ALPRAZOLAM 0.25 MG/1
TABLET ORAL
Qty: 90 TABLET | Refills: 0 | Status: SHIPPED
Start: 2022-01-07 | End: 2022-01-07 | Stop reason: SDUPTHER

## 2022-01-07 NOTE — TELEPHONE ENCOUNTER
Last Appointment:  11/24/2021  Future Appointments   Date Time Provider Channing Larry   2/22/2022  2:00 PM Lisette Nina  [address] [facility] [address]

## 2022-02-22 ENCOUNTER — OFFICE VISIT (OUTPATIENT)
Dept: FAMILY MEDICINE CLINIC | Age: 57
End: 2022-02-22
Payer: COMMERCIAL

## 2022-02-22 VITALS
OXYGEN SATURATION: 93 % | SYSTOLIC BLOOD PRESSURE: 124 MMHG | DIASTOLIC BLOOD PRESSURE: 84 MMHG | TEMPERATURE: 98.5 F | BODY MASS INDEX: 41.47 KG/M2 | WEIGHT: 315 LBS | HEART RATE: 96 BPM

## 2022-02-22 DIAGNOSIS — Z86.010 HISTORY OF COLONIC POLYPS: ICD-10-CM

## 2022-02-22 DIAGNOSIS — I10 PRIMARY HYPERTENSION: ICD-10-CM

## 2022-02-22 DIAGNOSIS — E78.2 MIXED HYPERLIPIDEMIA: ICD-10-CM

## 2022-02-22 DIAGNOSIS — E29.1 HYPOGONADISM IN MALE: ICD-10-CM

## 2022-02-22 DIAGNOSIS — Z12.5 PROSTATE CANCER SCREENING: Primary | ICD-10-CM

## 2022-02-22 DIAGNOSIS — F41.9 ANXIETY: ICD-10-CM

## 2022-02-22 DIAGNOSIS — Z12.5 PROSTATE CANCER SCREENING: ICD-10-CM

## 2022-02-22 DIAGNOSIS — M15.9 PRIMARY OSTEOARTHRITIS INVOLVING MULTIPLE JOINTS: ICD-10-CM

## 2022-02-22 DIAGNOSIS — G89.29 OTHER CHRONIC PAIN: ICD-10-CM

## 2022-02-22 DIAGNOSIS — R00.0 TACHYCARDIA: ICD-10-CM

## 2022-02-22 DIAGNOSIS — G25.81 RESTLESS LEG: ICD-10-CM

## 2022-02-22 LAB
BASOPHILS ABSOLUTE: 0.08 E9/L (ref 0–0.2)
BASOPHILS RELATIVE PERCENT: 1 % (ref 0–2)
EOSINOPHILS ABSOLUTE: 0.28 E9/L (ref 0.05–0.5)
EOSINOPHILS RELATIVE PERCENT: 3.6 % (ref 0–6)
HCT VFR BLD CALC: 47 % (ref 37–54)
HEMOGLOBIN: 15.2 G/DL (ref 12.5–16.5)
IMMATURE GRANULOCYTES #: 0.06 E9/L
IMMATURE GRANULOCYTES %: 0.8 % (ref 0–5)
LYMPHOCYTES ABSOLUTE: 1.46 E9/L (ref 1.5–4)
LYMPHOCYTES RELATIVE PERCENT: 18.8 % (ref 20–42)
MCH RBC QN AUTO: 29.6 PG (ref 26–35)
MCHC RBC AUTO-ENTMCNC: 32.3 % (ref 32–34.5)
MCV RBC AUTO: 91.4 FL (ref 80–99.9)
MONOCYTES ABSOLUTE: 0.92 E9/L (ref 0.1–0.95)
MONOCYTES RELATIVE PERCENT: 11.8 % (ref 2–12)
NEUTROPHILS ABSOLUTE: 4.98 E9/L (ref 1.8–7.3)
NEUTROPHILS RELATIVE PERCENT: 64 % (ref 43–80)
PDW BLD-RTO: 13.8 FL (ref 11.5–15)
PLATELET # BLD: 288 E9/L (ref 130–450)
PMV BLD AUTO: 9.8 FL (ref 7–12)
RBC # BLD: 5.14 E12/L (ref 3.8–5.8)
WBC # BLD: 7.8 E9/L (ref 4.5–11.5)

## 2022-02-22 PROCEDURE — 3017F COLORECTAL CA SCREEN DOC REV: CPT | Performed by: INTERNAL MEDICINE

## 2022-02-22 PROCEDURE — G8427 DOCREV CUR MEDS BY ELIG CLIN: HCPCS | Performed by: INTERNAL MEDICINE

## 2022-02-22 PROCEDURE — G8417 CALC BMI ABV UP PARAM F/U: HCPCS | Performed by: INTERNAL MEDICINE

## 2022-02-22 PROCEDURE — G8484 FLU IMMUNIZE NO ADMIN: HCPCS | Performed by: INTERNAL MEDICINE

## 2022-02-22 PROCEDURE — 99214 OFFICE O/P EST MOD 30 MIN: CPT | Performed by: INTERNAL MEDICINE

## 2022-02-22 PROCEDURE — 1036F TOBACCO NON-USER: CPT | Performed by: INTERNAL MEDICINE

## 2022-02-22 RX ORDER — HYDROCODONE BITARTRATE AND ACETAMINOPHEN 7.5; 325 MG/1; MG/1
1 TABLET ORAL EVERY 6 HOURS PRN
Qty: 120 TABLET | Refills: 0 | Status: SHIPPED | OUTPATIENT
Start: 2022-02-22 | End: 2022-05-20 | Stop reason: SDUPTHER

## 2022-02-22 RX ORDER — VALSARTAN AND HYDROCHLOROTHIAZIDE 320; 25 MG/1; MG/1
TABLET, FILM COATED ORAL
Qty: 30 TABLET | Refills: 5 | Status: SHIPPED | OUTPATIENT
Start: 2022-02-22 | End: 2022-08-24 | Stop reason: SDUPTHER

## 2022-02-22 RX ORDER — SILDENAFIL 50 MG/1
50 TABLET, FILM COATED ORAL DAILY PRN
Qty: 20 TABLET | Refills: 5 | Status: SHIPPED | OUTPATIENT
Start: 2022-02-22

## 2022-02-22 RX ORDER — ROPINIROLE 1 MG/1
TABLET, FILM COATED ORAL
Qty: 30 TABLET | Refills: 5 | Status: SHIPPED | OUTPATIENT
Start: 2022-02-22 | End: 2022-08-24 | Stop reason: SDUPTHER

## 2022-02-22 RX ORDER — METOPROLOL SUCCINATE 50 MG/1
50 TABLET, EXTENDED RELEASE ORAL DAILY
Qty: 30 TABLET | Refills: 5 | Status: SHIPPED | OUTPATIENT
Start: 2022-02-22 | End: 2022-08-24 | Stop reason: SDUPTHER

## 2022-02-22 NOTE — PROGRESS NOTES
19  Carlo Parmar : 1965 Sex: male  Age: 60 y.o. Chief Complaint   Patient presents with    Hypertension       Patient's mother  over the winter. She had dementia for quite some time. Patient denies cardiac or respiratory symptomatology. He has not been very active however. He did have Covid in the fall and he still has some fatigue issues. He does have a repeat colonoscopy scheduled on . Prostate exam will be done today. Other    Hyperlipidemia    Hypertension        Review of Systems  Health Maintenance:  Colonoscopy Screening - (2015)-referred back 10/20/2020  Influenza Vaccination - (10/20/2020) REFUSES  Physical Exam - (2022)  Prostate Exam - (2022)  Psa Test - (2022)  Rectal Exam - (2022)  Colonoscopy - (2015) KOLOZSI-NEXT 3/4/2022  Medical Problems:  Hypertension, Hypercholesterolemia, Osteoarthritis  Panic Disorder - (2014) RESTARTED ZOLOFT  Anxiety  Decreased Libido - (8/10/2012) EVALUATED BY DARREN  Cervical Radiculopathy - (2017) RECURRENT  Lumbar Strain - (2016) RECURRENT  Tachycardia - (2014) RESTARTED TOPROL  Hypogonadism - (2017) REPEAT [facility] work-up underway 2019  Restless Leg Syndrome - (2018) TRIAL REQUIP-increase Requip 2019  Carpal Tunnel Syndrome - (2018) RIGHT-EMG ORDERED  Right Knee Pain - (2018) ? ? OA  Obesity longstanding  Reviewed, no changes. FH:  Father:  Coronary Artery Disease (CAD). Mother:  . (Hx)  Reviewed, no changes. SH:  Marital: . Personal Habits: Cigarette Use: Negative For current cigarette smoker. Alcohol: Rarely consumes  alcohol. Exercise Type: exercises sporadically. Reviewed, no changes. Date: 2021  Was the patient queried about smoking behavior? Yes   Does the patient currently smoke? Smoking: Patient is a current smoker, smokes some days -  CIGARS.     Current Outpatient Medications:     ALPRAZolam (XANAX) 0.25 MG tablet, take 1 tablet by mouth once daily if needed, Disp: 90 tablet, Rfl: 0    valsartan-hydroCHLOROthiazide (DIOVAN-HCT) 320-25 MG per tablet, take 1 tablet by mouth once daily, Disp: 30 tablet, Rfl: 5    rOPINIRole (REQUIP) 1 MG tablet, take 1 tablet by mouth at bedtime, Disp: 30 tablet, Rfl: 5    rosuvastatin (CRESTOR) 20 MG tablet, Take 1 tablet by mouth daily, Disp: 30 tablet, Rfl: 5    sildenafil (VIAGRA) 50 MG tablet, Take 1 tablet by mouth daily as needed for Erectile Dysfunction, Disp: 20 tablet, Rfl: 0    metoprolol succinate (TOPROL XL) 50 MG extended release tablet, Take 1 tablet by mouth daily, Disp: 30 tablet, Rfl: 5    sertraline (ZOLOFT) 100 MG tablet, Take 2 tablets by mouth daily, Disp: 60 tablet, Rfl: 5    HYDROcodone-acetaminophen (NORCO) 7.5-325 MG per tablet, Take 1 tablet by mouth every 6 hours as needed for Pain for up to 30 days. Intended supply: 30 days, Disp: 120 tablet, Rfl: 0    HYDROcodone-acetaminophen (LORTAB) 7.5-325 MG per tablet, Take 1 tablet by mouth every 6 hours as needed for Pain for up to 10 days. Fill on 6-9-20, Disp: 40 tablet, Rfl: 0    HYDROcodone-acetaminophen (LORTAB) 7.5-325 MG per tablet, Take 1 tablet by mouth every 6 hours as needed for Pain for up to 10 days. Fill on 6-9-20, Disp: 40 tablet, Rfl: 0    HYDROcodone-acetaminophen (LORTAB) 7.5-325 MG per tablet, Take 1 tablet by mouth every 6 hours as needed for Pain for up to 30 days. Fill on 6-30-20, Disp: 120 tablet, Rfl: 0    HYDROcodone-acetaminophen (LORTAB) 7.5-325 MG per tablet, Take 1 tablet by mouth every 6 hours as needed for Pain for up to 30 days. Fill on 6-9-20, Disp: 120 tablet, Rfl: 0    HYDROcodone-acetaminophen (LORTAB) 7.5-325 MG per tablet, Take 1 tablet by mouth every 6 hours as needed for Pain for up to 30 days.  Fill on 5/9/20, Disp: 120 tablet, Rfl: 0  No Known Allergies    Past Medical History:   Diagnosis Date    Anxiety     Carpal tunnel syndrome of right wrist     Cervical radiculopathy 04/11/2016 Marital Status: Not on file   Intimate Partner Violence:     Fear of Current or Ex-Partner: Not on file    Emotionally Abused: Not on file    Physically Abused: Not on file    Sexually Abused: Not on file   Housing Stability:     Unable to Pay for Housing in the Last Year: Not on file    Number of [place] in the Last Year: Not on file    Unstable Housing in the Last Year: Not on file       Vitals:    02/22/22 1410   BP: 124/84   Pulse: 96   Temp: 98.5 °F (36.9 °C)   SpO2: 93%   Weight: (!) 323 lb (146.5 kg)       Physical Exam  Objective    Exam:  Const: Appears healthy,well developed and well nourished. Appears severely obese. Eyes: EOMI in both eyes. PERRL. ENMT: External canals are clear and dry. Tympanic membranes are intact. External nose WNL. Neck: Supple and symmetric. Palpation reveals no adenopathy. No masses appreciated. Thyroid  exhibits no nodules or thyromegaly. No JVD. Resp: Respirations are unlabored. Respiration rate is normal. Auscultate good airflow. No rales,  rhonchi or wheezes appreciated over the lungs bilaterally. CV: Rhythm is regular. S1 is normal. S2 is normal. Grade 2/6, systolic murmur. Carotids: no  bruits. Abdominal aorta is not palpable. Pedal pulses: 2+ and equal bilaterally. Extremities: No clubbing, cyanosis or edema. Abdomen: Bowel sounds are normoactive. Palpation of the abdomen reveals softness, but no  distension, organomegaly or tenderness. No abdominal masses. No palpable hepatosplenomegaly. /RECTAL: Normal perianal area. Normal sphincter tone. Prostate is normal in size without nodule. Stool is brown and guaiac negative  Musculo: Walks with a normal gait. Upper Extremities: Full ROM bilaterally. Lower Extremities:  Crepitation of the lower extremities. Skin: No evidence of stasis dermatitis of the lower extremity  Neuro: Alert and oriented x3. Mood is normal. Affect is normal. Speech is articulate and fluent.   Reflexes: DTR's are intact, symmetric and 2+ bilaterally, Tinel sign is negative but Phalen's sign is  positive on the right. Psych: Patient's attitude is cooperative. Patient's affect is appropriate. Judgement is realistic. Insight  is appropriate. Jaz Box was seen today for hypertension. Diagnoses and all orders for this visit:    Prostate cancer screening  -     PSA Screening; Future    Tachycardia  -     metoprolol succinate (TOPROL XL) 50 MG extended release tablet; Take 1 tablet by mouth daily    Primary osteoarthritis involving multiple joints  -     HYDROcodone-acetaminophen (NORCO) 7.5-325 MG per tablet; Take 1 tablet by mouth every 6 hours as needed for Pain for up to 30 days. Intended supply: 30 days    Restless leg  -     rOPINIRole (REQUIP) 1 MG tablet; take 1 tablet by mouth at bedtime    Primary hypertension    Anxiety    Hypogonadism in male  -     TSH; Future    Other chronic pain    History of colonic polyps  -     CBC with Auto Differential; Future    Mixed hyperlipidemia  -     Lipid Panel; Future  -     Comprehensive Metabolic Panel; Future    Other orders  -     valsartan-hydroCHLOROthiazide (DIOVAN-HCT) 320-25 MG per tablet; take 1 tablet by mouth once daily  -     sildenafil (VIAGRA) 50 MG tablet; Take 1 tablet by mouth daily as needed for Erectile Dysfunction    Pain management is performed. The patient has minimized his hydrocodone use. Patient is to follow-up with gastroenterology. I did refer him back again to urology for hypogonadism. Increased exercise and weight loss is encouraged. We did discuss bariatric surgery and medical weight management.

## 2022-02-23 LAB
ALBUMIN SERPL-MCNC: 4.6 G/DL (ref 3.5–5.2)
ALP BLD-CCNC: 80 U/L (ref 40–129)
ALT SERPL-CCNC: 22 U/L (ref 0–40)
ANION GAP SERPL CALCULATED.3IONS-SCNC: 19 MMOL/L (ref 7–16)
AST SERPL-CCNC: 19 U/L (ref 0–39)
BILIRUB SERPL-MCNC: 0.4 MG/DL (ref 0–1.2)
BUN BLDV-MCNC: 24 MG/DL (ref 6–20)
CALCIUM SERPL-MCNC: 9.9 MG/DL (ref 8.6–10.2)
CHLORIDE BLD-SCNC: 99 MMOL/L (ref 98–107)
CHOLESTEROL, TOTAL: 202 MG/DL (ref 0–199)
CO2: 20 MMOL/L (ref 22–29)
CREAT SERPL-MCNC: 1.5 MG/DL (ref 0.7–1.2)
GFR AFRICAN AMERICAN: 58
GFR NON-AFRICAN AMERICAN: 48 ML/MIN/1.73
GLUCOSE BLD-MCNC: 83 MG/DL (ref 74–99)
HDLC SERPL-MCNC: 34 MG/DL
LDL CHOLESTEROL CALCULATED: 111 MG/DL (ref 0–99)
POTASSIUM SERPL-SCNC: 4.1 MMOL/L (ref 3.5–5)
PROSTATE SPECIFIC ANTIGEN: 0.28 NG/ML (ref 0–4)
SODIUM BLD-SCNC: 138 MMOL/L (ref 132–146)
TOTAL PROTEIN: 7.9 G/DL (ref 6.4–8.3)
TRIGL SERPL-MCNC: 286 MG/DL (ref 0–149)
TSH SERPL DL<=0.05 MIU/L-ACNC: 2.83 UIU/ML (ref 0.27–4.2)
VLDLC SERPL CALC-MCNC: 57 MG/DL

## 2022-04-06 DIAGNOSIS — G25.81 RESTLESS LEG: ICD-10-CM

## 2022-04-06 RX ORDER — ROPINIROLE 1 MG/1
TABLET, FILM COATED ORAL
Qty: 30 TABLET | Refills: 5 | OUTPATIENT
Start: 2022-04-06

## 2022-05-20 DIAGNOSIS — M15.9 PRIMARY OSTEOARTHRITIS INVOLVING MULTIPLE JOINTS: ICD-10-CM

## 2022-05-20 RX ORDER — HYDROCODONE BITARTRATE AND ACETAMINOPHEN 7.5; 325 MG/1; MG/1
1 TABLET ORAL EVERY 6 HOURS PRN
Qty: 120 TABLET | Refills: 0 | Status: SHIPPED
Start: 2022-05-20 | End: 2022-07-22 | Stop reason: SDUPTHER

## 2022-05-25 ENCOUNTER — OFFICE VISIT (OUTPATIENT)
Dept: FAMILY MEDICINE CLINIC | Age: 57
End: 2022-05-25
Payer: COMMERCIAL

## 2022-05-25 VITALS
HEART RATE: 99 BPM | BODY MASS INDEX: 42.5 KG/M2 | OXYGEN SATURATION: 95 % | SYSTOLIC BLOOD PRESSURE: 142 MMHG | WEIGHT: 315 LBS | TEMPERATURE: 97.5 F | DIASTOLIC BLOOD PRESSURE: 88 MMHG

## 2022-05-25 DIAGNOSIS — G89.29 CHRONIC PAIN OF RIGHT KNEE: Primary | ICD-10-CM

## 2022-05-25 DIAGNOSIS — M25.561 CHRONIC PAIN OF RIGHT KNEE: Primary | ICD-10-CM

## 2022-05-25 DIAGNOSIS — E29.1 HYPOGONADISM IN MALE: ICD-10-CM

## 2022-05-25 DIAGNOSIS — I10 PRIMARY HYPERTENSION: ICD-10-CM

## 2022-05-25 DIAGNOSIS — E66.09 CLASS 2 OBESITY DUE TO EXCESS CALORIES WITHOUT SERIOUS COMORBIDITY WITH BODY MASS INDEX (BMI) OF 39.0 TO 39.9 IN ADULT: ICD-10-CM

## 2022-05-25 DIAGNOSIS — R00.0 TACHYCARDIA: ICD-10-CM

## 2022-05-25 DIAGNOSIS — E78.2 MIXED HYPERLIPIDEMIA: ICD-10-CM

## 2022-05-25 PROCEDURE — 99214 OFFICE O/P EST MOD 30 MIN: CPT | Performed by: INTERNAL MEDICINE

## 2022-05-25 PROCEDURE — G8427 DOCREV CUR MEDS BY ELIG CLIN: HCPCS | Performed by: INTERNAL MEDICINE

## 2022-05-25 PROCEDURE — 1036F TOBACCO NON-USER: CPT | Performed by: INTERNAL MEDICINE

## 2022-05-25 PROCEDURE — 3017F COLORECTAL CA SCREEN DOC REV: CPT | Performed by: INTERNAL MEDICINE

## 2022-05-25 PROCEDURE — G8417 CALC BMI ABV UP PARAM F/U: HCPCS | Performed by: INTERNAL MEDICINE

## 2022-05-25 ASSESSMENT — PATIENT HEALTH QUESTIONNAIRE - PHQ9
SUM OF ALL RESPONSES TO PHQ QUESTIONS 1-9: 0
SUM OF ALL RESPONSES TO PHQ QUESTIONS 1-9: 0
1. LITTLE INTEREST OR PLEASURE IN DOING THINGS: 0
SUM OF ALL RESPONSES TO PHQ QUESTIONS 1-9: 0
SUM OF ALL RESPONSES TO PHQ9 QUESTIONS 1 & 2: 0
SUM OF ALL RESPONSES TO PHQ QUESTIONS 1-9: 0
2. FEELING DOWN, DEPRESSED OR HOPELESS: 0

## 2022-05-25 NOTE — PROGRESS NOTES
19  Carlo Parmar : 1965 Sex: male  Age: 60 y.o. Chief Complaint   Patient presents with    Hypertension       His blood pressure was quite elevated initially but it did come down. Patient states that he is taking his blood pressures at home and they have been under better control than today's readings. Patient recently was evaluated by urology for consideration for testosterone therapy. Blood work is yet to be done but he may qualify for testosterone replacement therapy. Is complaining of some right knee discomfort. Patient had arthroscopy about 25 years ago to his right knee. He has noted that especially lateral movements his discomfort. This is mostly if he moves to the right. Is also noticed some swelling of the knee. There is been no recent injury or falls. Patient denies cardiac or respiratory symptoms. He is frustrated because he is fatigued and has lost strength. Hypertension    Other    Hyperlipidemia        Review of Systems  Health Maintenance:  Colonoscopy Screening - (2015)-referred back 10/20/2020  Influenza Vaccination - (10/20/2020) REFUSES  Physical Exam - (2022)  Prostate Exam - (2022)  Psa Test - (2022)  Rectal Exam - (2022)  Colonoscopy - (2015) KOLOZSI-NEXT 3/4/2022-next   Medical Problems:  Hypertension, Hypercholesterolemia, Osteoarthritis  Panic Disorder - (2014) RESTARTED ZOLOFT  Anxiety  Decreased Libido - (8/10/2012) EVALUATED BY DARREN  Cervical Radiculopathy - (2017) RECURRENT  Lumbar Strain - (2016) RECURRENT  Tachycardia - (2014) RESTARTED TOPROL  Hypogonadism - (2017) REPEAT [facility] work-up underway 2019  Restless Leg Syndrome - (2018) TRIAL REQUIP-increase Requip 2019  Carpal Tunnel Syndrome - (2018) RIGHT-EMG ORDERED  Right Knee Pain - (  Obesity longstanding  SURGERIES;  Right knee arthroscopy  Reviewed, no changes.   FH:  Father:  Coronary Artery Disease for up to 10 days. Fill on 6-9-20, Disp: 40 tablet, Rfl: 0    HYDROcodone-acetaminophen (LORTAB) 7.5-325 MG per tablet, Take 1 tablet by mouth every 6 hours as needed for Pain for up to 10 days. Fill on 6-9-20, Disp: 40 tablet, Rfl: 0  Allergies   Allergen Reactions    Honey Bee Venom Protein [Honey Bee Venom]        Past Medical History:   Diagnosis Date    Anxiety     Carpal tunnel syndrome of right wrist     Cervical radiculopathy 04/11/2016    recurrent    Chronic pain     Erectile dysfunction     Hyperlipidemia     Hypertension     Hypogonadism in male 12/20/2017    Lumbar strain 12/13/2016    recurrent    Osteoarthritis     Panic disorder 07/23/2014    Restless legs syndrome     Sleep apnea     Tachycardia 07/23/2014     Past Surgical History:   Procedure Laterality Date    NECK SURGERY      ablation     Family History   Problem Relation Age of Onset    Mental Illness Father         anxiety    Coronary Art Dis Father      Social History     Socioeconomic History    Marital status:      Spouse name: Not on file    Number of children: Not on file    Years of education: Not on file    Highest education level: Not on file   Occupational History    Not on file   Tobacco Use    Smoking status: Never Smoker    Smokeless tobacco: Never Used   Substance and Sexual Activity    Alcohol use: Yes     Comment: rarely consumes    Drug use: No    Sexual activity: Yes   Other Topics Concern    Not on file   Social History Narrative    Not on file     Social Determinants of Health     Financial Resource Strain: Low Risk     Difficulty of Paying Living Expenses: Not hard at all   Food Insecurity: No Food Insecurity    Worried About Running Out of Food in the Last Year: Never true    Pablo of Food in the Last Year: Never true   Transportation Needs: No Transportation Needs    Lack of Transportation (Medical): No    Lack of Transportation (Non-Medical):  No   Physical Activity:     Days of Exercise per Week: Not on file    Minutes of Exercise per Session: Not on file   Stress:     Feeling of Stress : Not on file   Social Connections:     Frequency of Communication with Friends and Family: Not on file    Frequency of Social Gatherings with Friends and Family: Not on file    Attends Bahai Services: Not on file    Active Member of [address] [place] [address] [employer] or Organizations: Not on file    Attends Club or Organization Meetings: Not on file    Marital Status: Not on file   Intimate Partner Violence:     Fear of Current or Ex-Partner: Not on file    Emotionally Abused: Not on file    Physically Abused: Not on file    Sexually Abused: Not on file   Housing Stability:     Unable to Pay for Housing in the Last Year: Not on file    Number of [place] in the Last Year: Not on file    Unstable Housing in the Last Year: Not on file       Vitals:    05/25/22 1506   BP: (!) 142/88   Pulse: 99   Temp: 97.5 °F (36.4 °C)   SpO2: 95%   Weight: (!) 331 lb (150.1 kg)       Physical Exam  Objective    Exam:  Const: Appears healthy,well developed and well nourished. Appears severely obese. Eyes: EOMI in both eyes. PERRL. ENMT: External canals are clear and dry. Tympanic membranes are intact. External nose WNL. Neck: Supple and symmetric. Palpation reveals no adenopathy. No masses appreciated. Thyroid  exhibits no nodules or thyromegaly. No JVD. Resp: Respirations are unlabored. Respiration rate is normal. Auscultate good airflow. No rales,  rhonchi or wheezes appreciated over the lungs bilaterally. CV: Rhythm is regular. S1 is normal. S2 is normal. Grade 2/6, systolic murmur. Carotids: no  bruits. Abdominal aorta is not palpable. Pedal pulses: 2+ and equal bilaterally. Extremities: No clubbing, cyanosis or edema. Abdomen: Bowel sounds are normoactive. Palpation of the abdomen reveals softness, but no  distension, organomegaly or tenderness. No abdominal masses. No palpable hepatosplenomegaly.   Musculo: [employer] with a normal gait. Upper Extremities: Full ROM bilaterally. Lower Extremities:  Crepitation of the lower extremities. Skin: No evidence of stasis dermatitis of the lower extremity  Neuro: Alert and oriented x3. Mood is normal. Affect is normal. Speech is articulate and fluent. Reflexes: DTR's are intact, symmetric and 2+ bilaterally, Tinel sign is negative but Phalen's sign is  positive on the right. Psych: Patient's attitude is cooperative. Patient's affect is appropriate. Judgement is realistic. Insight  is appropriate. Luis Rivera was seen today for hypertension. Diagnoses and all orders for this visit:    Chronic pain of right knee  -     External Referral To Physical Therapy    Primary hypertension    Tachycardia    Hypogonadism in male    Class 2 obesity due to excess calories without serious comorbidity with body mass index (BMI) of 39.0 to 39.9 in adult    Mixed hyperlipidemia    Patient will have lab work in the near future from urology. I will assess this when it becomes available. The patient is to continue to monitor blood pressures at home. Continue efforts at weight loss. He will go to physical therapy for his right knee discomfort.

## 2022-05-27 DIAGNOSIS — F41.1 GAD (GENERALIZED ANXIETY DISORDER): ICD-10-CM

## 2022-05-27 RX ORDER — SERTRALINE HYDROCHLORIDE 100 MG/1
TABLET, FILM COATED ORAL
Qty: 60 TABLET | Refills: 5 | Status: SHIPPED | OUTPATIENT
Start: 2022-05-27

## 2022-05-27 NOTE — TELEPHONE ENCOUNTER
Last Appointment:  5/25/2022  Future Appointments   Date Time Provider Channing Larry   8/24/2022  3:00 PM Palmer Lazaro  [address] [place] [address]

## 2022-07-19 ENCOUNTER — HOSPITAL ENCOUNTER (OUTPATIENT)
Age: 57
Discharge: HOME | End: 2022-07-19
Payer: COMMERCIAL

## 2022-07-19 LAB
HCT VFR BLD CALC: 44.6 % (ref 37–54)
HEMOGLOBIN: 14.5 G/DL (ref 12.5–16.5)
TESTOSTERONE TOTAL: 160.2 NG/DL

## 2022-07-19 PROCEDURE — 83002 ASSAY OF GONADOTROPIN (LH): CPT

## 2022-07-19 PROCEDURE — 85018 HEMOGLOBIN: CPT

## 2022-07-19 PROCEDURE — 84146 ASSAY OF PROLACTIN: CPT

## 2022-07-19 PROCEDURE — 84403 ASSAY OF TOTAL TESTOSTERONE: CPT

## 2022-07-19 PROCEDURE — 85014 HEMATOCRIT: CPT

## 2022-07-19 PROCEDURE — 36415 COLL VENOUS BLD VENIPUNCTURE: CPT

## 2022-07-20 LAB — PROLACTIN: 10.37 NG/ML

## 2022-07-22 DIAGNOSIS — M15.9 PRIMARY OSTEOARTHRITIS INVOLVING MULTIPLE JOINTS: ICD-10-CM

## 2022-07-22 RX ORDER — HYDROCODONE BITARTRATE AND ACETAMINOPHEN 7.5; 325 MG/1; MG/1
1 TABLET ORAL EVERY 6 HOURS PRN
Qty: 120 TABLET | Refills: 0 | Status: SHIPPED | OUTPATIENT
Start: 2022-07-22 | End: 2022-08-21

## 2022-07-22 NOTE — TELEPHONE ENCOUNTER
Last Appointment:  5/25/2022  Future Appointments   Date Time Provider Channing Larry   8/24/2022  3:00 PM Minnie Box  [address] [place] [address]

## 2022-07-24 LAB — LUTEINIZING HORMONE: 3.7 MIU/ML

## 2022-08-24 ENCOUNTER — OFFICE VISIT (OUTPATIENT)
Dept: FAMILY MEDICINE CLINIC | Age: 57
End: 2022-08-24
Payer: COMMERCIAL

## 2022-08-24 VITALS
TEMPERATURE: 98.1 F | DIASTOLIC BLOOD PRESSURE: 80 MMHG | HEART RATE: 98 BPM | OXYGEN SATURATION: 95 % | WEIGHT: 315 LBS | BODY MASS INDEX: 42.11 KG/M2 | SYSTOLIC BLOOD PRESSURE: 130 MMHG

## 2022-08-24 DIAGNOSIS — E29.1 HYPOGONADISM IN MALE: ICD-10-CM

## 2022-08-24 DIAGNOSIS — G25.81 RESTLESS LEG: ICD-10-CM

## 2022-08-24 DIAGNOSIS — E66.09 CLASS 2 OBESITY DUE TO EXCESS CALORIES WITHOUT SERIOUS COMORBIDITY WITH BODY MASS INDEX (BMI) OF 39.0 TO 39.9 IN ADULT: ICD-10-CM

## 2022-08-24 DIAGNOSIS — E78.2 MIXED HYPERLIPIDEMIA: ICD-10-CM

## 2022-08-24 DIAGNOSIS — M15.9 PRIMARY OSTEOARTHRITIS INVOLVING MULTIPLE JOINTS: ICD-10-CM

## 2022-08-24 DIAGNOSIS — R00.0 TACHYCARDIA: ICD-10-CM

## 2022-08-24 DIAGNOSIS — G89.29 OTHER CHRONIC PAIN: ICD-10-CM

## 2022-08-24 DIAGNOSIS — I10 PRIMARY HYPERTENSION: Primary | ICD-10-CM

## 2022-08-24 PROBLEM — E66.813 CLASS 3 SEVERE OBESITY DUE TO EXCESS CALORIES WITH BODY MASS INDEX (BMI) OF 40.0 TO 44.9 IN ADULT: Status: ACTIVE | Noted: 2019-06-22

## 2022-08-24 PROBLEM — E66.812 CLASS 2 OBESITY DUE TO EXCESS CALORIES WITHOUT SERIOUS COMORBIDITY WITH BODY MASS INDEX (BMI) OF 39.0 TO 39.9 IN ADULT: Status: RESOLVED | Noted: 2019-06-22 | Resolved: 2022-08-24

## 2022-08-24 PROBLEM — E66.01 CLASS 3 SEVERE OBESITY DUE TO EXCESS CALORIES WITH BODY MASS INDEX (BMI) OF 40.0 TO 44.9 IN ADULT (HCC): Status: ACTIVE | Noted: 2019-06-22

## 2022-08-24 PROCEDURE — G8427 DOCREV CUR MEDS BY ELIG CLIN: HCPCS | Performed by: INTERNAL MEDICINE

## 2022-08-24 PROCEDURE — 99214 OFFICE O/P EST MOD 30 MIN: CPT | Performed by: INTERNAL MEDICINE

## 2022-08-24 PROCEDURE — 3017F COLORECTAL CA SCREEN DOC REV: CPT | Performed by: INTERNAL MEDICINE

## 2022-08-24 PROCEDURE — 1036F TOBACCO NON-USER: CPT | Performed by: INTERNAL MEDICINE

## 2022-08-24 PROCEDURE — G8417 CALC BMI ABV UP PARAM F/U: HCPCS | Performed by: INTERNAL MEDICINE

## 2022-08-24 RX ORDER — ROPINIROLE 1 MG/1
TABLET, FILM COATED ORAL
Qty: 30 TABLET | Refills: 5 | Status: SHIPPED | OUTPATIENT
Start: 2022-08-24

## 2022-08-24 RX ORDER — HYDROCODONE BITARTRATE AND ACETAMINOPHEN 7.5; 325 MG/1; MG/1
1 TABLET ORAL EVERY 6 HOURS PRN
Qty: 120 TABLET | Refills: 0 | Status: SHIPPED | OUTPATIENT
Start: 2022-09-16 | End: 2022-10-16

## 2022-08-24 RX ORDER — VALSARTAN AND HYDROCHLOROTHIAZIDE 320; 25 MG/1; MG/1
TABLET, FILM COATED ORAL
Qty: 30 TABLET | Refills: 5 | Status: SHIPPED | OUTPATIENT
Start: 2022-08-24

## 2022-08-24 RX ORDER — METOPROLOL SUCCINATE 50 MG/1
50 TABLET, EXTENDED RELEASE ORAL DAILY
Qty: 30 TABLET | Refills: 5 | Status: SHIPPED | OUTPATIENT
Start: 2022-08-24

## 2022-08-24 RX ORDER — ROSUVASTATIN CALCIUM 20 MG/1
20 TABLET, COATED ORAL DAILY
Qty: 30 TABLET | Refills: 5 | Status: SHIPPED | OUTPATIENT
Start: 2022-08-24

## 2022-08-24 NOTE — PROGRESS NOTES
19  Carlo Parmar : 1965 Sex: male  Age: 60 y.o. No chief complaint on file. Patient is doing better. His libido has increased and his strength and motivation is also increased. He is using his CPAP more regularly and I have asked him to do this consistently. He was evaluated by urology and was placed on testosterone therapy. He denies cardiac or respiratory symptoms. I did review his labs from urology. He does have a consistently low testosterone level. His prolactin and LH levels are normal and his hemoglobin and hematocrit are normal.    Hypertension    Other    Hyperlipidemia      Review of Systems  Health Maintenance:  Colonoscopy Screening - (2015)-referred back 10/20/2020  Influenza Vaccination - (10/20/2020) REFUSES  Physical Exam - (2022)  Prostate Exam - (2022)  Psa Test - (2022)  Rectal Exam - (2022)  Colonoscopy - (2015) KOLOZSI-NEXT 3/4/2022-next   Medical Problems:  Hypertension, Hypercholesterolemia, Osteoarthritis  Panic Disorder - (2014) RESTARTED ZOLOFT  Anxiety  Decreased Libido - (8/10/2012) EVALUATED BY DARREN  Cervical Radiculopathy - (2017) RECURRENT  Lumbar Strain - (2016) RECURRENT  Tachycardia - (2014) RESTARTED TOPROL  Hypogonadism - (2017) REPEAT [facility] work-up underway 2019  Restless Leg Syndrome - (2018) TRIAL REQUIP-increase Requip 2019  Carpal Tunnel Syndrome - (2018) RIGHT-EMG ORDERED  Right Knee Pain - (  Obesity longstanding  Low testosterone- Kenji García- started therapy 2022  SURGERIES;  Right knee arthroscopy  Reviewed, no changes. FH:  Father:  Coronary Artery Disease (CAD). Mother:  . (Hx)  Reviewed, no changes. SH:  Marital: . Personal Habits: Cigarette Use: Negative For current cigarette smoker. Alcohol: Rarely consumes  alcohol. Exercise Type: exercises sporadically. Reviewed, no changes.   Date: 2022  Was the patient queried about smoking behavior? Yes   Does the patient currently smoke? Smoking: Patient is a current smoker, smokes some days -  CIGARS. Current Outpatient Medications:     [START ON 9/16/2022] HYDROcodone-acetaminophen (LORTAB) 7.5-325 MG per tablet, Take 1 tablet by mouth every 6 hours as needed for Pain for up to 30 days. Fill on 5/9/20, Disp: 120 tablet, Rfl: 0    metoprolol succinate (TOPROL XL) 50 MG extended release tablet, Take 1 tablet by mouth daily, Disp: 30 tablet, Rfl: 5    rOPINIRole (REQUIP) 1 MG tablet, take 1 tablet by mouth at bedtime, Disp: 30 tablet, Rfl: 5    rosuvastatin (CRESTOR) 20 MG tablet, Take 1 tablet by mouth daily, Disp: 30 tablet, Rfl: 5    valsartan-hydroCHLOROthiazide (DIOVAN-HCT) 320-25 MG per tablet, take 1 tablet by mouth once daily, Disp: 30 tablet, Rfl: 5    HYDROcodone-acetaminophen (NORCO) 7.5-325 MG per tablet, Take 1 tablet by mouth every 6 hours as needed for Pain for up to 30 days. Intended supply: 30 days, Disp: 120 tablet, Rfl: 0    sertraline (ZOLOFT) 100 MG tablet, take 2 tablets by mouth once daily, Disp: 60 tablet, Rfl: 5    HYDROcodone-acetaminophen (LORTAB) 7.5-325 MG per tablet, Take 1 tablet by mouth every 6 hours as needed for Pain for up to 30 days. Fill on 6-9-20, Disp: 120 tablet, Rfl: 0    sildenafil (VIAGRA) 50 MG tablet, Take 1 tablet by mouth daily as needed for Erectile Dysfunction, Disp: 20 tablet, Rfl: 5    HYDROcodone-acetaminophen (LORTAB) 7.5-325 MG per tablet, Take 1 tablet by mouth every 6 hours as needed for Pain for up to 10 days. Fill on 6-9-20, Disp: 40 tablet, Rfl: 0    HYDROcodone-acetaminophen (LORTAB) 7.5-325 MG per tablet, Take 1 tablet by mouth every 6 hours as needed for Pain for up to 10 days. Fill on 6-9-20, Disp: 40 tablet, Rfl: 0    HYDROcodone-acetaminophen (LORTAB) 7.5-325 MG per tablet, Take 1 tablet by mouth every 6 hours as needed for Pain for up to 30 days.  Fill on 6-30-20, Disp: 120 tablet, Rfl: 0  Allergies   Allergen Reactions    Honey Bee Venom Protein [Honey Bee Venom]        Past Medical History:   Diagnosis Date    Anxiety     Carpal tunnel syndrome of right wrist     Cervical radiculopathy 04/11/2016    recurrent    Chronic pain     Erectile dysfunction     Hyperlipidemia     Hypertension     Hypogonadism in male 12/20/2017    Lumbar strain 12/13/2016    recurrent    Osteoarthritis     Panic disorder 07/23/2014    Restless legs syndrome     Sleep apnea     Tachycardia 07/23/2014     Past Surgical History:   Procedure Laterality Date    NECK SURGERY      ablation     Family History   Problem Relation Age of Onset    Mental Illness Father         anxiety    Coronary Art Dis Father      Social History     Socioeconomic History    Marital status:      Spouse name: Not on file    Number of children: Not on file    Years of education: Not on file    Highest education level: Not on file   Occupational History    Not on file   Tobacco Use    Smoking status: Never    Smokeless tobacco: Never   Substance and Sexual Activity    Alcohol use: Yes     Comment: rarely consumes    Drug use: No    Sexual activity: Yes   Other Topics Concern    Not on file   Social History Narrative    Not on file     Social Determinants of Health     Financial Resource Strain: Low Risk     Difficulty of Paying Living Expenses: Not hard at all   Food Insecurity: No Food Insecurity    Worried About Running Out of Food in the Last Year: Never true    Ran Out of Food in the Last Year: Never true   Transportation Needs: No Transportation Needs    Lack of Transportation (Medical): No    Lack of Transportation (Non-Medical):  No   Physical Activity: Not on file   Stress: Not on file   Social Connections: Not on file   Intimate Partner Violence: Not on file   Housing Stability: Not on file       Vitals:    08/24/22 1509   BP: 130/80   Pulse: 98   Temp: 98.1 °F (36.7 °C)   SpO2: 95%   Weight: (!) 328 lb (148.8 kg)       Physical Exam  Objective    Exam:  Const: Appears healthy,well mouth at bedtime    Mixed hyperlipidemia  -     rosuvastatin (CRESTOR) 20 MG tablet; Take 1 tablet by mouth daily  -     Comprehensive Metabolic Panel; Future    Primary osteoarthritis involving multiple joints  -     Comprehensive Metabolic Panel; Future    Hypogonadism in male    Class 2 obesity due to excess calories without serious comorbidity with body mass index (BMI) of 39.0 to 39.9 in adult    Other orders  -     valsartan-hydroCHLOROthiazide (DIOVAN-HCT) 320-25 MG per tablet; take 1 tablet by mouth once daily  Patient will have a CMP today. The patient will be seen back in 4 months. Prescription management is performed. OARRS report is checked and is appropriate. The patient did have repeat colonoscopy. Next colonoscopy will be done in 5 years.

## 2022-10-05 DIAGNOSIS — F41.9 ANXIETY: ICD-10-CM

## 2022-10-05 RX ORDER — ALPRAZOLAM 0.25 MG/1
TABLET ORAL
Qty: 30 TABLET | Refills: 0 | Status: SHIPPED | OUTPATIENT
Start: 2022-10-05 | End: 2025-04-05

## 2022-10-05 NOTE — TELEPHONE ENCOUNTER
Asiya Berg called the office because his previous prescription of Alprazolam . He is out of the medication and is requesting a refill.      Please send to [facility] [address] Appointment:  2022  Future Appointments   Date Time Provider Channing Larry   2022 11:45 AM [facility] we are unable to fill

## 2022-11-07 DIAGNOSIS — M15.9 PRIMARY OSTEOARTHRITIS INVOLVING MULTIPLE JOINTS: ICD-10-CM

## 2022-11-07 NOTE — TELEPHONE ENCOUNTER
Last Appointment:  8/24/2022  Future Appointments   Date Time Provider Channing Larry   12/5/2022 11:45 AM Erinn Johnston MD [facility]

## 2022-11-08 RX ORDER — HYDROCODONE BITARTRATE AND ACETAMINOPHEN 7.5; 325 MG/1; MG/1
1 TABLET ORAL EVERY 6 HOURS PRN
Qty: 120 TABLET | Refills: 0 | Status: SHIPPED | OUTPATIENT
Start: 2022-11-08 | End: 2022-12-08

## 2022-11-11 DIAGNOSIS — F41.9 ANXIETY: ICD-10-CM

## 2022-11-14 LAB
ALBUMIN SERPL-MCNC: 4.5 G/DL (ref 3.5–5.2)
ALP BLD-CCNC: 65 U/L (ref 40–129)
ALT SERPL-CCNC: 17 U/L (ref 0–40)
AST SERPL-CCNC: 16 U/L (ref 0–39)
BILIRUB SERPL-MCNC: 0.5 MG/DL (ref 0–1.2)
BILIRUBIN DIRECT: <0.2 MG/DL (ref 0–0.3)
BILIRUBIN, INDIRECT: NORMAL MG/DL (ref 0–1)
CHOLESTEROL, TOTAL: 169 MG/DL (ref 0–199)
HCT VFR BLD CALC: 42.6 % (ref 37–54)
HDLC SERPL-MCNC: 32 MG/DL
HEMOGLOBIN: 14.1 G/DL (ref 12.5–16.5)
LDL CHOLESTEROL CALCULATED: 92 MG/DL (ref 0–99)
PROSTATE SPECIFIC ANTIGEN: 0.26 NG/ML (ref 0–4)
TESTOSTERONE TOTAL: 254.4 NG/DL
TOTAL PROTEIN: 7.2 G/DL (ref 6.4–8.3)
TRIGL SERPL-MCNC: 223 MG/DL (ref 0–149)
VLDLC SERPL CALC-MCNC: 45 MG/DL

## 2022-11-17 RX ORDER — ALPRAZOLAM 0.25 MG/1
TABLET ORAL
Qty: 30 TABLET | Refills: 0 | Status: SHIPPED | OUTPATIENT
Start: 2022-11-17 | End: 2022-12-17

## 2022-11-17 NOTE — TELEPHONE ENCOUNTER
Last Appointment:  Visit date not found  Future Appointments   Date Time Provider Channing Larry   12/5/2022 11:45 AM Felipe Gambino  [facility]

## 2022-12-05 ENCOUNTER — OFFICE VISIT (OUTPATIENT)
Dept: PRIMARY CARE CLINIC | Age: 57
End: 2022-12-05
Payer: COMMERCIAL

## 2022-12-05 VITALS
DIASTOLIC BLOOD PRESSURE: 90 MMHG | OXYGEN SATURATION: 94 % | HEIGHT: 74 IN | SYSTOLIC BLOOD PRESSURE: 112 MMHG | TEMPERATURE: 98 F | WEIGHT: 315 LBS | HEART RATE: 92 BPM | BODY MASS INDEX: 40.43 KG/M2 | RESPIRATION RATE: 16 BRPM

## 2022-12-05 DIAGNOSIS — M15.9 PRIMARY OSTEOARTHRITIS INVOLVING MULTIPLE JOINTS: ICD-10-CM

## 2022-12-05 DIAGNOSIS — F41.9 ANXIETY: ICD-10-CM

## 2022-12-05 DIAGNOSIS — I10 PRIMARY HYPERTENSION: Primary | ICD-10-CM

## 2022-12-05 DIAGNOSIS — R00.0 TACHYCARDIA: ICD-10-CM

## 2022-12-05 DIAGNOSIS — E66.01 CLASS 3 SEVERE OBESITY DUE TO EXCESS CALORIES WITHOUT SERIOUS COMORBIDITY WITH BODY MASS INDEX (BMI) OF 40.0 TO 44.9 IN ADULT (HCC): ICD-10-CM

## 2022-12-05 DIAGNOSIS — G47.33 OBSTRUCTIVE SLEEP APNEA SYNDROME: ICD-10-CM

## 2022-12-05 DIAGNOSIS — E29.1 HYPOGONADISM IN MALE: ICD-10-CM

## 2022-12-05 PROCEDURE — 1036F TOBACCO NON-USER: CPT | Performed by: INTERNAL MEDICINE

## 2022-12-05 PROCEDURE — G8427 DOCREV CUR MEDS BY ELIG CLIN: HCPCS | Performed by: INTERNAL MEDICINE

## 2022-12-05 PROCEDURE — 3074F SYST BP LT 130 MM HG: CPT | Performed by: INTERNAL MEDICINE

## 2022-12-05 PROCEDURE — G8417 CALC BMI ABV UP PARAM F/U: HCPCS | Performed by: INTERNAL MEDICINE

## 2022-12-05 PROCEDURE — 3017F COLORECTAL CA SCREEN DOC REV: CPT | Performed by: INTERNAL MEDICINE

## 2022-12-05 PROCEDURE — 3078F DIAST BP <80 MM HG: CPT | Performed by: INTERNAL MEDICINE

## 2022-12-05 PROCEDURE — 99214 OFFICE O/P EST MOD 30 MIN: CPT | Performed by: INTERNAL MEDICINE

## 2022-12-05 PROCEDURE — G8484 FLU IMMUNIZE NO ADMIN: HCPCS | Performed by: INTERNAL MEDICINE

## 2022-12-05 RX ORDER — SILDENAFIL CITRATE 100 MG
100 TABLET ORAL PRN
Qty: 1 TABLET | Refills: 0 | Status: SHIPPED | OUTPATIENT
Start: 2022-12-05

## 2022-12-05 RX ORDER — TESTOSTERONE GEL, 1% 10 MG/G
GEL TRANSDERMAL
COMMUNITY
Start: 2022-12-02

## 2022-12-05 SDOH — ECONOMIC STABILITY: FOOD INSECURITY: WITHIN THE PAST 12 MONTHS, YOU WORRIED THAT YOUR FOOD WOULD RUN OUT BEFORE YOU GOT MONEY TO BUY MORE.: NEVER TRUE

## 2022-12-05 SDOH — ECONOMIC STABILITY: FOOD INSECURITY: WITHIN THE PAST 12 MONTHS, THE FOOD YOU BOUGHT JUST DIDN'T LAST AND YOU DIDN'T HAVE MONEY TO GET MORE.: NEVER TRUE

## 2022-12-05 ASSESSMENT — SOCIAL DETERMINANTS OF HEALTH (SDOH): HOW HARD IS IT FOR YOU TO PAY FOR THE VERY BASICS LIKE FOOD, HOUSING, MEDICAL CARE, AND HEATING?: NOT VERY HARD

## 2022-12-05 NOTE — PROGRESS NOTES
19  Carlo Parmar : 1965 Sex: male  Age: 59 y.o. Chief Complaint   Patient presents with    Follow-up     Pt has no complaints         Patient had recent evaluation by urology. His testosterone gel dosing was increased. He really feels that has not made a tremendous amount of difference. I did review all of his labs and the note from urology. Patient did have a dysplastic polyp his last colonoscopy in March and he will need repeat colonoscopy this coming March. This should be arranged by Dr Marlene Almazan. The patient is denying cardiac or respiratory symptoms. He denies any headaches. He has not had any GI complaints including any bloody stools. He states that generic Viagra had not been helpful but brand-name Viagra had been helpful. He would like 1 tablet of brand-name Viagra to see if it is indeed the cause of his erectile issues.   I believe this is probably going to be multifactorial.    Hypertension    Other    Hyperlipidemia      Review of Systems  Health Maintenance:  Colonoscopy Screening - (2015)-  Influenza Vaccination - (10/20/2020) REFUSES  Physical Exam - (2022)  Prostate Exam - (2022)  Psa Test - (2022)  Rectal Exam - (2022)  Colonoscopy - (2015) KOLOZSI-NEXT 3/4/2022-next   Medical Problems:  Hypertension, Hypercholesterolemia, Osteoarthritis  Panic Disorder - (2014) RESTARTED ZOLOFT  Anxiety  Decreased Libido - (8/10/2012) EVALUATED BY DARREN  Cervical Radiculopathy - (2017) RECURRENT  Lumbar Strain - (2016) RECURRENT  Tachycardia - (2014) RESTARTED TOPROL  Hypogonadism - (2017) REPEAT [facility] work-up underway 2019  Restless Leg Syndrome - (2018) TRIAL REQUIP-increase Requip 2019  Carpal Tunnel Syndrome - (2018) RIGHT-EMG ORDERED  Right Knee Pain - (  Obesity longstanding  Low testosterone- Whitney Palma- started therapy 2022  SURGERIES;  Right knee arthroscopy  Reviewed, no changes. FH:  Father:  Coronary Artery Disease (CAD). Mother:  . (Hx)  Reviewed, no changes. SH:  Marital: . Personal Habits: Cigarette Use: Negative For current cigarette smoker. Alcohol: Rarely consumes  alcohol. Exercise Type: exercises sporadically. Reviewed, no changes. Date: 5/25/2022  Was the patient queried about smoking behavior? Yes   Does the patient currently smoke? Smoking: Patient is a current smoker, smokes some days -  CIGARS. Current Outpatient Medications:     testosterone (ANDROGEL; TESTIM) 50 MG/5GM (1%) GEL 1% gel, , Disp: , Rfl:     ALPRAZolam (XANAX) 0.25 MG tablet, take 1 tablet by mouth once daily if needed, Disp: 30 tablet, Rfl: 0    HYDROcodone-acetaminophen (NORCO) 7.5-325 MG per tablet, Take 1 tablet by mouth every 6 hours as needed for Pain for up to 30 days. Intended supply: 30 days, Disp: 120 tablet, Rfl: 0    metoprolol succinate (TOPROL XL) 50 MG extended release tablet, Take 1 tablet by mouth daily, Disp: 30 tablet, Rfl: 5    rOPINIRole (REQUIP) 1 MG tablet, take 1 tablet by mouth at bedtime, Disp: 30 tablet, Rfl: 5    rosuvastatin (CRESTOR) 20 MG tablet, Take 1 tablet by mouth daily, Disp: 30 tablet, Rfl: 5    valsartan-hydroCHLOROthiazide (DIOVAN-HCT) 320-25 MG per tablet, take 1 tablet by mouth once daily, Disp: 30 tablet, Rfl: 5    sertraline (ZOLOFT) 100 MG tablet, take 2 tablets by mouth once daily, Disp: 60 tablet, Rfl: 5    sildenafil (VIAGRA) 50 MG tablet, Take 1 tablet by mouth daily as needed for Erectile Dysfunction, Disp: 20 tablet, Rfl: 5    HYDROcodone-acetaminophen (LORTAB) 7.5-325 MG per tablet, Take 1 tablet by mouth every 6 hours as needed for Pain for up to 30 days. Fill on 6-9-20, Disp: 120 tablet, Rfl: 0    HYDROcodone-acetaminophen (LORTAB) 7.5-325 MG per tablet, Take 1 tablet by mouth every 6 hours as needed for Pain for up to 30 days.  Fill on 5/9/20, Disp: 120 tablet, Rfl: 0    HYDROcodone-acetaminophen (LORTAB) 7.5-325 MG per tablet, Take 1 tablet by mouth every 6 hours as needed for Pain for up to 10 days. Fill on 6-9-20, Disp: 40 tablet, Rfl: 0    HYDROcodone-acetaminophen (LORTAB) 7.5-325 MG per tablet, Take 1 tablet by mouth every 6 hours as needed for Pain for up to 10 days. Fill on 6-9-20, Disp: 40 tablet, Rfl: 0    HYDROcodone-acetaminophen (LORTAB) 7.5-325 MG per tablet, Take 1 tablet by mouth every 6 hours as needed for Pain for up to 30 days.  Fill on 6-30-20, Disp: 120 tablet, Rfl: 0  Allergies   Allergen Reactions    Honey Bee Venom Protein [Honey Bee Venom]        Past Medical History:   Diagnosis Date    Anxiety     Carpal tunnel syndrome of right wrist     Cervical radiculopathy 04/11/2016    recurrent    Chronic pain     Erectile dysfunction     Hyperlipidemia     Hypertension     Hypogonadism in male 12/20/2017    Lumbar strain 12/13/2016    recurrent    Osteoarthritis     Panic disorder 07/23/2014    Restless legs syndrome     Sleep apnea     Tachycardia 07/23/2014     Past Surgical History:   Procedure Laterality Date    NECK SURGERY      ablation     Family History   Problem Relation Age of Onset    Mental Illness Father         anxiety    Coronary Art Dis Father      Social History     Socioeconomic History    Marital status:      Spouse name: Not on file    Number of children: Not on file    Years of education: Not on file    Highest education level: Not on file   Occupational History    Not on file   Tobacco Use    Smoking status: Never    Smokeless tobacco: Never   Substance and Sexual Activity    Alcohol use: Yes     Comment: rarely consumes    Drug use: No    Sexual activity: Yes   Other Topics Concern    Not on file   Social History Narrative    Not on file     Social Determinants of Health     Financial Resource Strain: Low Risk     Difficulty of Paying Living Expenses: Not very hard   Food Insecurity: No Food Insecurity    Worried About Running Out of Food in the Last Year: Never true    [address] in the Last Year: Never true   Transportation Needs: Not on file   Physical Activity: Not on file   Stress: Not on file   Social Connections: Not on file   Intimate Partner Violence: Not on file   Housing Stability: Not on file       Vitals:    12/05/22 1232 12/05/22 1239   BP: (!) 120/90 (!) 112/90   Site: Left Upper Arm Left Upper Arm   Pulse: 92    Resp: 16    Temp: 98 °F (36.7 °C)    SpO2: 94%    Weight: (!) 326 lb (147.9 kg)    Height: 6' 2\" (1.88 m)        Physical Exam  Objective    Exam:  Const: Appears healthy,well developed and well nourished. Appears severely obese. Eyes: EOMI in both eyes. PERRL. ENMT: External canals are clear and dry. Tympanic membranes are intact. External nose WNL. Neck: Supple and symmetric. Palpation reveals no adenopathy. No masses appreciated. Thyroid  exhibits no nodules or thyromegaly. No JVD. Resp: Respirations are unlabored. Respiration rate is normal. Auscultate good airflow. No rales,  rhonchi or wheezes appreciated over the lungs bilaterally. CV: Rhythm is regular. S1 is normal. S2 is normal. Grade 2/6, systolic murmur. Carotids: no  bruits. Abdominal aorta is not palpable. Pedal pulses: 2+ and equal bilaterally. Extremities: No clubbing, cyanosis or edema. Abdomen: Bowel sounds are normoactive. Palpation of the abdomen reveals softness, but no  distension, organomegaly or tenderness. No abdominal masses. No palpable hepatosplenomegaly. Musculo: Walks with a normal gait. Upper Extremities: Full ROM bilaterally. Lower Extremities:  Crepitation of the lower extremities. Skin: No evidence of stasis dermatitis of the lower extremity  Neuro: Alert and oriented x3. Mood is normal. Affect is normal. Speech is articulate and fluent. Reflexes: DTR's are intact, symmetric and 2+ bilaterally, Tinel sign is negative but Phalen's sign is  positive on the right. Psych: Patient's attitude is cooperative. Patient's affect is appropriate. Judgement is realistic.  Insight  is appropriate. [address] Matt Levi was seen today for follow-up. Diagnoses and all orders for this visit:    Primary hypertension    Tachycardia    Hypogonadism in male    Primary osteoarthritis involving multiple joints    Anxiety    Class 3 severe obesity due to excess calories without serious comorbidity with body mass index (BMI) of 40.0 to 44.9 in [facility])    Obstructive sleep apnea syndrome    Other orders  -     VIAGRA 100 MG tablet; Take 1 tablet by mouth as needed for Erectile Dysfunction  I did review the labs that just done recently. The patient will need kidney function testing at his next visit. If the colonoscopy is not arranged then I will need to make that arrangement the next visit. He will call Allen Guzman for pain management if and when he needs hydrocodone.

## 2023-01-02 DIAGNOSIS — M15.9 PRIMARY OSTEOARTHRITIS INVOLVING MULTIPLE JOINTS: ICD-10-CM

## 2023-01-03 DIAGNOSIS — G25.81 RESTLESS LEG: ICD-10-CM

## 2023-01-03 RX ORDER — HYDROCODONE BITARTRATE AND ACETAMINOPHEN 7.5; 325 MG/1; MG/1
1 TABLET ORAL EVERY 6 HOURS PRN
Qty: 120 TABLET | Refills: 0 | Status: SHIPPED | OUTPATIENT
Start: 2023-01-03 | End: 2023-02-02

## 2023-01-03 NOTE — TELEPHONE ENCOUNTER
Last Appointment:  12/5/2022  Future Appointments   Date Time Provider Channing Larry   4/6/2023  1:15 PM Santy James  [facility]

## 2023-01-04 RX ORDER — ROPINIROLE 1 MG/1
TABLET, FILM COATED ORAL
Qty: 30 TABLET | Refills: 5 | Status: SHIPPED | OUTPATIENT
Start: 2023-01-04

## 2023-01-04 NOTE — TELEPHONE ENCOUNTER
Last Appointment:  12/5/2022  Future Appointments   Date Time Provider Channing Larry   4/6/2023  1:15 PM Arvind Serra  [facility]

## 2023-01-24 DIAGNOSIS — F41.9 ANXIETY: ICD-10-CM

## 2023-01-24 RX ORDER — ALPRAZOLAM 0.25 MG/1
TABLET ORAL
Qty: 30 TABLET | Refills: 0 | Status: SHIPPED | OUTPATIENT
Start: 2023-01-24 | End: 2023-02-24

## 2023-01-24 NOTE — TELEPHONE ENCOUNTER
Last Appointment:  Visit date not found  Future Appointments   Date Time Provider Channing Larry   4/6/2023  1:15 PM Teena Newberry  [facility]

## 2023-03-02 DIAGNOSIS — M15.9 PRIMARY OSTEOARTHRITIS INVOLVING MULTIPLE JOINTS: ICD-10-CM

## 2023-03-03 DIAGNOSIS — M15.9 PRIMARY OSTEOARTHRITIS INVOLVING MULTIPLE JOINTS: ICD-10-CM

## 2023-03-03 RX ORDER — HYDROCODONE BITARTRATE AND ACETAMINOPHEN 7.5; 325 MG/1; MG/1
1 TABLET ORAL EVERY 6 HOURS PRN
Qty: 120 TABLET | Refills: 0 | Status: SHIPPED | OUTPATIENT
Start: 2023-03-03 | End: 2023-04-02

## 2023-03-03 RX ORDER — HYDROCODONE BITARTRATE AND ACETAMINOPHEN 7.5; 325 MG/1; MG/1
1 TABLET ORAL EVERY 6 HOURS PRN
Qty: 120 TABLET | Refills: 0 | OUTPATIENT
Start: 2023-03-03 | End: 2023-04-02

## 2023-04-06 ENCOUNTER — OFFICE VISIT (OUTPATIENT)
Dept: PRIMARY CARE CLINIC | Age: 58
End: 2023-04-06

## 2023-04-06 VITALS
SYSTOLIC BLOOD PRESSURE: 120 MMHG | TEMPERATURE: 97 F | OXYGEN SATURATION: 96 % | HEART RATE: 96 BPM | DIASTOLIC BLOOD PRESSURE: 85 MMHG | BODY MASS INDEX: 41.98 KG/M2 | WEIGHT: 315 LBS

## 2023-04-06 DIAGNOSIS — Z56.6 STRESS AT WORK: ICD-10-CM

## 2023-04-06 DIAGNOSIS — E29.1 HYPOGONADISM IN MALE: ICD-10-CM

## 2023-04-06 DIAGNOSIS — F41.9 ANXIETY: ICD-10-CM

## 2023-04-06 DIAGNOSIS — F41.1 GAD (GENERALIZED ANXIETY DISORDER): ICD-10-CM

## 2023-04-06 DIAGNOSIS — E78.2 MIXED HYPERLIPIDEMIA: ICD-10-CM

## 2023-04-06 DIAGNOSIS — I10 PRIMARY HYPERTENSION: Primary | ICD-10-CM

## 2023-04-06 DIAGNOSIS — G89.29 OTHER CHRONIC PAIN: ICD-10-CM

## 2023-04-06 DIAGNOSIS — R00.0 TACHYCARDIA: ICD-10-CM

## 2023-04-06 DIAGNOSIS — I10 PRIMARY HYPERTENSION: ICD-10-CM

## 2023-04-06 LAB
ALBUMIN SERPL-MCNC: 4.5 G/DL (ref 3.5–5.2)
ALP SERPL-CCNC: 69 U/L (ref 40–129)
ALT SERPL-CCNC: 30 U/L (ref 0–40)
ANION GAP SERPL CALCULATED.3IONS-SCNC: 12 MMOL/L (ref 7–16)
AST SERPL-CCNC: 19 U/L (ref 0–39)
BASOPHILS # BLD: 0.08 E9/L (ref 0–0.2)
BASOPHILS NFR BLD: 1 % (ref 0–2)
BILIRUB SERPL-MCNC: 0.5 MG/DL (ref 0–1.2)
BUN SERPL-MCNC: 18 MG/DL (ref 6–20)
CALCIUM SERPL-MCNC: 9.7 MG/DL (ref 8.6–10.2)
CHLORIDE SERPL-SCNC: 105 MMOL/L (ref 98–107)
CO2 SERPL-SCNC: 24 MMOL/L (ref 22–29)
CREAT SERPL-MCNC: 1.3 MG/DL (ref 0.7–1.2)
EOSINOPHIL # BLD: 0.26 E9/L (ref 0.05–0.5)
EOSINOPHIL NFR BLD: 3.3 % (ref 0–6)
ERYTHROCYTE [DISTWIDTH] IN BLOOD BY AUTOMATED COUNT: 14.1 FL (ref 11.5–15)
GLUCOSE SERPL-MCNC: 101 MG/DL (ref 74–99)
HCT VFR BLD AUTO: 49 % (ref 37–54)
HGB BLD-MCNC: 15.6 G/DL (ref 12.5–16.5)
IMM GRANULOCYTES # BLD: 0.05 E9/L
IMM GRANULOCYTES NFR BLD: 0.6 % (ref 0–5)
LYMPHOCYTES # BLD: 1.34 E9/L (ref 1.5–4)
LYMPHOCYTES NFR BLD: 17.1 % (ref 20–42)
MCH RBC QN AUTO: 28.9 PG (ref 26–35)
MCHC RBC AUTO-ENTMCNC: 31.8 % (ref 32–34.5)
MCV RBC AUTO: 90.9 FL (ref 80–99.9)
MONOCYTES # BLD: 0.73 E9/L (ref 0.1–0.95)
MONOCYTES NFR BLD: 9.3 % (ref 2–12)
NEUTROPHILS # BLD: 5.37 E9/L (ref 1.8–7.3)
NEUTS SEG NFR BLD: 68.7 % (ref 43–80)
PLATELET # BLD AUTO: 310 E9/L (ref 130–450)
PMV BLD AUTO: 9.6 FL (ref 7–12)
POTASSIUM SERPL-SCNC: 4.3 MMOL/L (ref 3.5–5)
PROT SERPL-MCNC: 7.4 G/DL (ref 6.4–8.3)
RBC # BLD AUTO: 5.39 E12/L (ref 3.8–5.8)
SODIUM SERPL-SCNC: 141 MMOL/L (ref 132–146)
WBC # BLD: 7.8 E9/L (ref 4.5–11.5)

## 2023-04-06 RX ORDER — ALPRAZOLAM 0.25 MG/1
TABLET ORAL
Qty: 30 TABLET | Refills: 2 | Status: SHIPPED | OUTPATIENT
Start: 2023-04-06 | End: 2023-05-05

## 2023-04-06 RX ORDER — ROSUVASTATIN CALCIUM 20 MG/1
20 TABLET, COATED ORAL DAILY
Qty: 30 TABLET | Refills: 5 | Status: SHIPPED | OUTPATIENT
Start: 2023-04-06

## 2023-04-06 RX ORDER — METOPROLOL SUCCINATE 50 MG/1
50 TABLET, EXTENDED RELEASE ORAL DAILY
Qty: 30 TABLET | Refills: 5 | Status: SHIPPED | OUTPATIENT
Start: 2023-04-06

## 2023-04-06 RX ORDER — VALSARTAN AND HYDROCHLOROTHIAZIDE 320; 25 MG/1; MG/1
TABLET, FILM COATED ORAL
Qty: 30 TABLET | Refills: 5 | Status: SHIPPED | OUTPATIENT
Start: 2023-04-06

## 2023-04-06 SDOH — ECONOMIC STABILITY: FOOD INSECURITY: WITHIN THE PAST 12 MONTHS, YOU WORRIED THAT YOUR FOOD WOULD RUN OUT BEFORE YOU GOT MONEY TO BUY MORE.: NEVER TRUE

## 2023-04-06 SDOH — ECONOMIC STABILITY: INCOME INSECURITY: HOW HARD IS IT FOR YOU TO PAY FOR THE VERY BASICS LIKE FOOD, HOUSING, MEDICAL CARE, AND HEATING?: NOT HARD AT ALL

## 2023-04-06 SDOH — ECONOMIC STABILITY: FOOD INSECURITY: WITHIN THE PAST 12 MONTHS, THE FOOD YOU BOUGHT JUST DIDN'T LAST AND YOU DIDN'T HAVE MONEY TO GET MORE.: NEVER TRUE

## 2023-04-06 SDOH — ECONOMIC STABILITY: HOUSING INSECURITY
IN THE LAST 12 MONTHS, WAS THERE A TIME WHEN YOU DID NOT HAVE A STEADY PLACE TO SLEEP OR SLEPT IN A SHELTER (INCLUDING NOW)?: NO

## 2023-04-06 SDOH — HEALTH STABILITY - MENTAL HEALTH: OTHER PHYSICAL AND MENTAL STRAIN RELATED TO WORK: Z56.6

## 2023-04-06 ASSESSMENT — PATIENT HEALTH QUESTIONNAIRE - PHQ9
2. FEELING DOWN, DEPRESSED OR HOPELESS: 1
SUM OF ALL RESPONSES TO PHQ9 QUESTIONS 1 & 2: 2
SUM OF ALL RESPONSES TO PHQ QUESTIONS 1-9: 2
1. LITTLE INTEREST OR PLEASURE IN DOING THINGS: 1
SUM OF ALL RESPONSES TO PHQ QUESTIONS 1-9: 2

## 2023-04-06 NOTE — PROGRESS NOTES
5/9/20, Disp: 120 tablet, Rfl: 0  Allergies   Allergen Reactions    Honey Bee Venom Protein [Honey Bee Venom]        Past Medical History:   Diagnosis Date    Anxiety     Carpal tunnel syndrome of right wrist     Cervical radiculopathy 04/11/2016    recurrent    Chronic pain     Erectile dysfunction     Hyperlipidemia     Hypertension     Hypogonadism in male 12/20/2017    Lumbar strain 12/13/2016    recurrent    Osteoarthritis     Panic disorder 07/23/2014    Restless legs syndrome     Sleep apnea     Tachycardia 07/23/2014     Past Surgical History:   Procedure Laterality Date    NECK SURGERY      ablation     Family History   Problem Relation Age of Onset    Mental Illness Father         anxiety    Coronary Art Dis Father      Social History     Socioeconomic History    Marital status:      Spouse name: Not on file    Number of children: Not on file    Years of education: Not on file    Highest education level: Not on file   Occupational History    Not on file   Tobacco Use    Smoking status: Never    Smokeless tobacco: Never   Substance and Sexual Activity    Alcohol use: Yes     Comment: rarely consumes    Drug use: No    Sexual activity: Yes   Other Topics Concern    Not on file   Social History Narrative    Not on file     Social Determinants of Health     Financial Resource Strain: Low Risk     Difficulty of Paying Living Expenses: Not hard at all   Food Insecurity: No Food Insecurity    Worried About Running Out of Food in the Last Year: Never true    [address] in the Last Year: Never true   Transportation Needs: Unknown    Lack of Transportation (Medical): Not on file    Lack of Transportation (Non-Medical):  No   Physical Activity: Not on file   Stress: Not on file   Social Connections: Not on file   Intimate Partner Violence: Not on file   Housing Stability: Unknown    Unable to Pay for Housing in the Last Year: Not on file    Number of Places Lived in the Last Year: Not on file    Unstable

## 2023-04-21 DIAGNOSIS — M15.9 PRIMARY OSTEOARTHRITIS INVOLVING MULTIPLE JOINTS: ICD-10-CM

## 2023-04-21 DIAGNOSIS — G89.29 OTHER CHRONIC PAIN: ICD-10-CM

## 2023-04-22 RX ORDER — HYDROCODONE BITARTRATE AND ACETAMINOPHEN 7.5; 325 MG/1; MG/1
1 TABLET ORAL EVERY 6 HOURS PRN
Qty: 120 TABLET | Refills: 0 | Status: SHIPPED
Start: 2023-04-22 | End: 2023-06-16 | Stop reason: SDUPTHER

## 2023-04-23 RX ORDER — HYDROCODONE BITARTRATE AND ACETAMINOPHEN 7.5; 325 MG/1; MG/1
1 TABLET ORAL EVERY 6 HOURS PRN
Qty: 120 TABLET | Refills: 0 | OUTPATIENT
Start: 2023-04-23 | End: 2023-05-23

## 2023-05-24 LAB
ESTRADIOL SERPL-SCNC: 30.1 PG/ML
HCT VFR BLD AUTO: 49.7 % (ref 37–54)
HGB BLD-MCNC: 15.7 G/DL (ref 12.5–16.5)
TESTOST SERPL-MCNC: 453.7 NG/DL

## 2023-08-14 ENCOUNTER — OFFICE VISIT (OUTPATIENT)
Dept: PRIMARY CARE CLINIC | Age: 58
End: 2023-08-14
Payer: COMMERCIAL

## 2023-08-14 VITALS
WEIGHT: 300 LBS | OXYGEN SATURATION: 96 % | SYSTOLIC BLOOD PRESSURE: 122 MMHG | HEART RATE: 99 BPM | TEMPERATURE: 96.9 F | BODY MASS INDEX: 38.52 KG/M2 | DIASTOLIC BLOOD PRESSURE: 82 MMHG

## 2023-08-14 DIAGNOSIS — E29.1 HYPOGONADISM IN MALE: ICD-10-CM

## 2023-08-14 DIAGNOSIS — G47.33 OBSTRUCTIVE SLEEP APNEA SYNDROME: ICD-10-CM

## 2023-08-14 DIAGNOSIS — G25.81 RESTLESS LEG: ICD-10-CM

## 2023-08-14 DIAGNOSIS — I10 PRIMARY HYPERTENSION: ICD-10-CM

## 2023-08-14 DIAGNOSIS — E78.2 MIXED HYPERLIPIDEMIA: ICD-10-CM

## 2023-08-14 DIAGNOSIS — M15.9 PRIMARY OSTEOARTHRITIS INVOLVING MULTIPLE JOINTS: ICD-10-CM

## 2023-08-14 DIAGNOSIS — I10 PRIMARY HYPERTENSION: Primary | ICD-10-CM

## 2023-08-14 LAB
ABSOLUTE IMMATURE GRANULOCYTE: 0.04 K/UL (ref 0–0.58)
ALBUMIN SERPL-MCNC: 4.7 G/DL (ref 3.5–5.2)
ALP BLD-CCNC: 72 U/L (ref 40–129)
ALT SERPL-CCNC: 19 U/L (ref 0–40)
ANION GAP SERPL CALCULATED.3IONS-SCNC: 14 MMOL/L (ref 7–16)
AST SERPL-CCNC: 16 U/L (ref 0–39)
BASOPHILS ABSOLUTE: 0.05 K/UL (ref 0–0.2)
BASOPHILS RELATIVE PERCENT: 1 % (ref 0–2)
BILIRUB SERPL-MCNC: 0.5 MG/DL (ref 0–1.2)
BUN BLDV-MCNC: 18 MG/DL (ref 6–20)
CALCIUM SERPL-MCNC: 9.6 MG/DL (ref 8.6–10.2)
CHLORIDE BLD-SCNC: 103 MMOL/L (ref 98–107)
CHOLESTEROL: 181 MG/DL
CO2: 22 MMOL/L (ref 22–29)
CREAT SERPL-MCNC: <0.1 MG/DL (ref 0.7–1.2)
EOSINOPHILS ABSOLUTE: 0.28 K/UL (ref 0.05–0.5)
EOSINOPHILS RELATIVE PERCENT: 3 % (ref 0–6)
GFR SERPL CREATININE-BSD FRML MDRD: ABNORMAL ML/MIN/1.73M2
GLUCOSE BLD-MCNC: 109 MG/DL (ref 74–99)
HCT VFR BLD CALC: 48.1 % (ref 37–54)
HDLC SERPL-MCNC: 27 MG/DL
HEMOGLOBIN: 15.6 G/DL (ref 12.5–16.5)
IMMATURE GRANULOCYTES: 1 % (ref 0–5)
LDL CHOLESTEROL: ABNORMAL MG/DL
LYMPHOCYTES ABSOLUTE: 1.14 K/UL (ref 1.5–4)
LYMPHOCYTES RELATIVE PERCENT: 13 % (ref 20–42)
MCH RBC QN AUTO: 30 PG (ref 26–35)
MCHC RBC AUTO-ENTMCNC: 32.4 G/DL (ref 32–34.5)
MCV RBC AUTO: 92.5 FL (ref 80–99.9)
MONOCYTES ABSOLUTE: 0.66 K/UL (ref 0.1–0.95)
MONOCYTES RELATIVE PERCENT: 8 % (ref 2–12)
NEUTROPHILS ABSOLUTE: 6.61 K/UL (ref 1.8–7.3)
NEUTROPHILS RELATIVE PERCENT: 75 % (ref 43–80)
PDW BLD-RTO: 13.3 % (ref 11.5–15)
PLATELET # BLD: 287 K/UL (ref 130–450)
PMV BLD AUTO: 10.7 FL (ref 7–12)
POTASSIUM SERPL-SCNC: 3.8 MMOL/L (ref 3.5–5)
RBC # BLD: 5.2 M/UL (ref 3.8–5.8)
SODIUM BLD-SCNC: 139 MMOL/L (ref 132–146)
TOTAL PROTEIN: 7.4 G/DL (ref 6.4–8.3)
TRIGL SERPL-MCNC: 417 MG/DL
TSH SERPL DL<=0.05 MIU/L-ACNC: 1.55 UIU/ML (ref 0.27–4.2)
VLDLC SERPL CALC-MCNC: ABNORMAL MG/DL
WBC # BLD: 8.8 K/UL (ref 4.5–11.5)

## 2023-08-14 PROCEDURE — 3074F SYST BP LT 130 MM HG: CPT | Performed by: INTERNAL MEDICINE

## 2023-08-14 PROCEDURE — 1036F TOBACCO NON-USER: CPT | Performed by: INTERNAL MEDICINE

## 2023-08-14 PROCEDURE — G8417 CALC BMI ABV UP PARAM F/U: HCPCS | Performed by: INTERNAL MEDICINE

## 2023-08-14 PROCEDURE — 3079F DIAST BP 80-89 MM HG: CPT | Performed by: INTERNAL MEDICINE

## 2023-08-14 PROCEDURE — 3017F COLORECTAL CA SCREEN DOC REV: CPT | Performed by: INTERNAL MEDICINE

## 2023-08-14 PROCEDURE — G8427 DOCREV CUR MEDS BY ELIG CLIN: HCPCS | Performed by: INTERNAL MEDICINE

## 2023-08-14 PROCEDURE — 99214 OFFICE O/P EST MOD 30 MIN: CPT | Performed by: INTERNAL MEDICINE

## 2023-08-14 RX ORDER — ARIPIPRAZOLE 5 MG/1
5 TABLET ORAL NIGHTLY
COMMUNITY
Start: 2023-07-10

## 2023-08-14 RX ORDER — ALPRAZOLAM 0.25 MG/1
0.25 TABLET ORAL DAILY PRN
Qty: 30 TABLET | Refills: 0 | Status: SHIPPED | OUTPATIENT
Start: 2023-08-14 | End: 2023-09-13

## 2023-08-14 RX ORDER — TADALAFIL 5 MG/1
TABLET ORAL
COMMUNITY
Start: 2023-05-31

## 2023-08-14 RX ORDER — HYDROCODONE BITARTRATE AND ACETAMINOPHEN 7.5; 325 MG/1; MG/1
1 TABLET ORAL EVERY 6 HOURS PRN
Qty: 120 TABLET | Refills: 0 | Status: SHIPPED | OUTPATIENT
Start: 2023-08-14 | End: 2023-09-13

## 2023-08-14 RX ORDER — ALPRAZOLAM 0.25 MG/1
0.25 TABLET ORAL DAILY PRN
COMMUNITY
Start: 2023-06-28 | End: 2023-08-14 | Stop reason: SDUPTHER

## 2023-08-14 RX ORDER — FLUOXETINE HYDROCHLORIDE 20 MG/1
CAPSULE ORAL
COMMUNITY
Start: 2023-07-25

## 2023-08-14 NOTE — PROGRESS NOTES
19  Ray Ghulam : 1965 Sex: male  Age: 59 y.o. Chief Complaint   Patient presents with    Hypertension         Is doing much better he is lost 27 pounds over the last 4 months. He is seeing a counselor now and is on a combination of Abilify and fluoxetine. He is up and on this combination for about a month. He had his testosterone supplementation increased recently. We did review those levels. The patient is not having any headache. He denies any chest pain or chest pressure. He denies any tachycardia or shortness of breath. Denies any GI or  symptomatology. Erectile function has been better with daily use of Cialis. He has not begun to exercise but we discussed this today. He will start out gradually by walking and then hopefully ramp up this activity. Hypertension    Other    Hyperlipidemia      Review of Systems  Health Maintenance:  Colonoscopy Screening - (2015)-  Influenza Vaccination - (10/20/2020) REFUSES  Physical Exam - (2022)  Prostate Exam - (2022)  Psa Test -  RDREVNA  Rectal Exam - (2022)  Colonoscopy - (2015) KOLOZSI-NEXT 3/4/2022-next   Medical Problems:  Hypertension, Hypercholesterolemia, Osteoarthritis  Panic Disorder - (2014) RESTARTED ZOLOFT  Anxiety  Decreased Libido - (8/10/2012) EVALUATED BY DARREN  Cervical Radiculopathy - (2017) RECURRENT  Lumbar Strain - (2016) RECURRENT  Tachycardia - (2014) RESTARTED TOPROL  Hypogonadism - (2017) REPEAT [facility] work-up underway 2019  Restless Leg Syndrome - (2018) TRIAL REQUIP-increase Requip 2019  Carpal Tunnel Syndrome - (2018) RIGHT-EMG ORDERED  Right Knee Pain - (  Obesity longstanding  Low testosterone- Manuelacortezdebbie Floresshelbype- started therapy 2022  SURGERIES;  Right knee arthroscopy  Reviewed, no changes. FH:  Father:  Coronary Artery Disease (CAD). Mother:  . (Hx)  Reviewed, no changes. SH:  Marital: .   Personal Habits:

## 2023-08-15 DIAGNOSIS — R79.89 LOW SERUM CREATININE: Primary | ICD-10-CM

## 2023-09-12 DIAGNOSIS — I10 PRIMARY HYPERTENSION: ICD-10-CM

## 2023-09-12 DIAGNOSIS — E78.2 MIXED HYPERLIPIDEMIA: ICD-10-CM

## 2023-09-12 DIAGNOSIS — E29.1 HYPOGONADISM IN MALE: ICD-10-CM

## 2023-09-12 DIAGNOSIS — G25.81 RESTLESS LEG: ICD-10-CM

## 2023-09-12 DIAGNOSIS — M15.9 PRIMARY OSTEOARTHRITIS INVOLVING MULTIPLE JOINTS: ICD-10-CM

## 2023-09-12 RX ORDER — ALPRAZOLAM 0.25 MG/1
0.25 TABLET ORAL DAILY PRN
Qty: 30 TABLET | Refills: 0 | Status: SHIPPED | OUTPATIENT
Start: 2023-09-12 | End: 2023-10-12

## 2023-09-22 LAB
ANION GAP SERPL CALCULATED.3IONS-SCNC: 9 MEQ/L (ref 3–11)
BUN BLDV-MCNC: 18 MG/DL (ref 6–20)
CALCIUM SERPL-MCNC: 9.6 MG/DL (ref 8.5–10.5)
CHLORIDE BLD-SCNC: 107 MEQ/L (ref 98–107)
CO2: 25 MEQ/L (ref 21–31)
CREAT SERPL-MCNC: 1.2 MG/DL (ref 0.6–1.3)
CREATININE + EGFR PANEL: 75 ML/MIN
GFR NON-AFRICAN AMERICAN: 62 ML/MIN
GLUCOSE BLD-MCNC: 102 MG/DL (ref 70–99)
POTASSIUM SERPL-SCNC: 4 MEQ/L (ref 3.6–5)
SODIUM BLD-SCNC: 141 MEQ/L (ref 135–145)

## 2023-09-29 DIAGNOSIS — M15.9 PRIMARY OSTEOARTHRITIS INVOLVING MULTIPLE JOINTS: ICD-10-CM

## 2023-09-29 RX ORDER — HYDROCODONE BITARTRATE AND ACETAMINOPHEN 7.5; 325 MG/1; MG/1
1 TABLET ORAL EVERY 6 HOURS PRN
Qty: 120 TABLET | Refills: 0 | Status: SHIPPED | OUTPATIENT
Start: 2023-09-29 | End: 2023-10-29

## 2023-09-29 NOTE — TELEPHONE ENCOUNTER
Last Appointment:  8/14/2023  Future Appointments   Date Time Provider [address]   11/14/2023  3:30 PM Dejah Smith MD [address] [place] [address]

## 2023-10-01 ENCOUNTER — HOSPITAL ENCOUNTER (EMERGENCY)
Age: 58
Discharge: ANOTHER HOSPITAL | End: 2023-10-02
Attending: EMERGENCY MEDICINE | Admitting: PSYCHIATRY & NEUROLOGY
Payer: COMMERCIAL

## 2023-10-01 DIAGNOSIS — F32.A DEPRESSION WITH SUICIDAL IDEATION: Primary | ICD-10-CM

## 2023-10-01 DIAGNOSIS — R45.851 DEPRESSION WITH SUICIDAL IDEATION: Primary | ICD-10-CM

## 2023-10-01 LAB
ALBUMIN SERPL-MCNC: 4.8 G/DL (ref 3.5–5.2)
ALP SERPL-CCNC: 68 U/L (ref 40–129)
ALT SERPL-CCNC: 17 U/L (ref 0–40)
AMPHET UR QL SCN: NEGATIVE
ANION GAP SERPL CALCULATED.3IONS-SCNC: 12 MMOL/L (ref 7–16)
APAP SERPL-MCNC: <5 UG/ML (ref 10–30)
AST SERPL-CCNC: 14 U/L (ref 0–39)
BARBITURATES UR QL SCN: NEGATIVE
BASOPHILS # BLD: 0.03 K/UL (ref 0–0.2)
BASOPHILS NFR BLD: 0 % (ref 0–2)
BENZODIAZ UR QL: NEGATIVE
BILIRUB SERPL-MCNC: 1 MG/DL (ref 0–1.2)
BUN SERPL-MCNC: 17 MG/DL (ref 6–20)
BUPRENORPHINE UR QL: NEGATIVE
CALCIUM SERPL-MCNC: 10.2 MG/DL (ref 8.6–10.2)
CANNABINOIDS UR QL SCN: POSITIVE
CHLORIDE SERPL-SCNC: 101 MMOL/L (ref 98–107)
CO2 SERPL-SCNC: 26 MMOL/L (ref 22–29)
COCAINE UR QL SCN: NEGATIVE
CREAT SERPL-MCNC: 1.2 MG/DL (ref 0.7–1.2)
EKG ATRIAL RATE: 98 BPM
EKG P AXIS: 19 DEGREES
EKG P-R INTERVAL: 170 MS
EKG Q-T INTERVAL: 390 MS
EKG QRS DURATION: 92 MS
EKG QTC CALCULATION (BAZETT): 497 MS
EKG R AXIS: -3 DEGREES
EKG T AXIS: 23 DEGREES
EKG VENTRICULAR RATE: 98 BPM
EOSINOPHIL # BLD: 0.09 K/UL (ref 0.05–0.5)
EOSINOPHILS RELATIVE PERCENT: 1 % (ref 0–6)
ERYTHROCYTE [DISTWIDTH] IN BLOOD BY AUTOMATED COUNT: 13.2 % (ref 11.5–15)
ETHANOLAMINE SERPL-MCNC: <10 MG/DL
FENTANYL UR QL: NEGATIVE
GFR SERPL CREATININE-BSD FRML MDRD: >60 ML/MIN/1.73M2
GLUCOSE SERPL-MCNC: 108 MG/DL (ref 74–99)
HCT VFR BLD AUTO: 48.2 % (ref 37–54)
HGB BLD-MCNC: 16.1 G/DL (ref 12.5–16.5)
IMM GRANULOCYTES # BLD AUTO: 0.03 K/UL (ref 0–0.58)
IMM GRANULOCYTES NFR BLD: 0 % (ref 0–5)
LYMPHOCYTES NFR BLD: 0.87 K/UL (ref 1.5–4)
LYMPHOCYTES RELATIVE PERCENT: 12 % (ref 20–42)
MCH RBC QN AUTO: 30 PG (ref 26–35)
MCHC RBC AUTO-ENTMCNC: 33.4 G/DL (ref 32–34.5)
MCV RBC AUTO: 89.8 FL (ref 80–99.9)
METHADONE UR QL: NEGATIVE
MONOCYTES NFR BLD: 0.55 K/UL (ref 0.1–0.95)
MONOCYTES NFR BLD: 8 % (ref 2–12)
NEUTROPHILS NFR BLD: 78 % (ref 43–80)
NEUTS SEG NFR BLD: 5.59 K/UL (ref 1.8–7.3)
OPIATES UR QL SCN: POSITIVE
OXYCODONE UR QL SCN: NEGATIVE
PCP UR QL SCN: NEGATIVE
PLATELET # BLD AUTO: 311 K/UL (ref 130–450)
PMV BLD AUTO: 9.2 FL (ref 7–12)
POTASSIUM SERPL-SCNC: 4.1 MMOL/L (ref 3.5–5)
PROT SERPL-MCNC: 7.6 G/DL (ref 6.4–8.3)
RBC # BLD AUTO: 5.37 M/UL (ref 3.8–5.8)
SALICYLATES SERPL-MCNC: <0.3 MG/DL (ref 0–30)
SODIUM SERPL-SCNC: 139 MMOL/L (ref 132–146)
TEST INFORMATION: ABNORMAL
TOXIC TRICYCLIC SC,BLOOD: NEGATIVE
TSH SERPL DL<=0.05 MIU/L-ACNC: 1.43 UIU/ML (ref 0.27–4.2)
WBC OTHER # BLD: 7.2 K/UL (ref 4.5–11.5)

## 2023-10-01 PROCEDURE — 93010 ELECTROCARDIOGRAM REPORT: CPT | Performed by: INTERNAL MEDICINE

## 2023-10-01 PROCEDURE — 99285 EMERGENCY DEPT VISIT HI MDM: CPT

## 2023-10-01 PROCEDURE — 6360000002 HC RX W HCPCS: Performed by: STUDENT IN AN ORGANIZED HEALTH CARE EDUCATION/TRAINING PROGRAM

## 2023-10-01 PROCEDURE — 80307 DRUG TEST PRSMV CHEM ANLYZR: CPT

## 2023-10-01 PROCEDURE — 85025 COMPLETE CBC W/AUTO DIFF WBC: CPT

## 2023-10-01 PROCEDURE — 80179 DRUG ASSAY SALICYLATE: CPT

## 2023-10-01 PROCEDURE — G0480 DRUG TEST DEF 1-7 CLASSES: HCPCS

## 2023-10-01 PROCEDURE — 80053 COMPREHEN METABOLIC PANEL: CPT

## 2023-10-01 PROCEDURE — 80143 DRUG ASSAY ACETAMINOPHEN: CPT

## 2023-10-01 PROCEDURE — 93005 ELECTROCARDIOGRAM TRACING: CPT | Performed by: EMERGENCY MEDICINE

## 2023-10-01 PROCEDURE — 6370000000 HC RX 637 (ALT 250 FOR IP): Performed by: EMERGENCY MEDICINE

## 2023-10-01 PROCEDURE — 84443 ASSAY THYROID STIM HORMONE: CPT

## 2023-10-01 PROCEDURE — 96374 THER/PROPH/DIAG INJ IV PUSH: CPT

## 2023-10-01 RX ORDER — LORAZEPAM 2 MG/ML
INJECTION INTRAMUSCULAR
Status: DISPENSED
Start: 2023-10-01 | End: 2023-10-02

## 2023-10-01 RX ORDER — LORAZEPAM 0.5 MG/1
0.5 TABLET ORAL ONCE
Status: COMPLETED | OUTPATIENT
Start: 2023-10-01 | End: 2023-10-01

## 2023-10-01 RX ORDER — LORAZEPAM 2 MG/ML
1 INJECTION INTRAMUSCULAR ONCE
Status: DISCONTINUED | OUTPATIENT
Start: 2023-10-01 | End: 2023-10-01

## 2023-10-01 RX ORDER — LORAZEPAM 2 MG/ML
2 INJECTION INTRAMUSCULAR ONCE
Status: COMPLETED | OUTPATIENT
Start: 2023-10-01 | End: 2023-10-01

## 2023-10-01 RX ADMIN — LORAZEPAM 0.5 MG: 0.5 TABLET ORAL at 15:32

## 2023-10-01 RX ADMIN — LORAZEPAM 2 MG: 2 INJECTION INTRAMUSCULAR; INTRAVENOUS at 19:14

## 2023-10-01 ASSESSMENT — PATIENT HEALTH QUESTIONNAIRE - PHQ9
SUM OF ALL RESPONSES TO PHQ QUESTIONS 1-9: 0
2. FEELING DOWN, DEPRESSED OR HOPELESS: 0
SUM OF ALL RESPONSES TO PHQ9 QUESTIONS 1 & 2: 0
SUM OF ALL RESPONSES TO PHQ QUESTIONS 1-9: 0
SUM OF ALL RESPONSES TO PHQ QUESTIONS 1-9: 0
1. LITTLE INTEREST OR PLEASURE IN DOING THINGS: 0
SUM OF ALL RESPONSES TO PHQ QUESTIONS 1-9: 0

## 2023-10-01 ASSESSMENT — ENCOUNTER SYMPTOMS
BACK PAIN: 0
COUGH: 0
COLOR CHANGE: 0
ABDOMINAL PAIN: 0
SHORTNESS OF BREATH: 0

## 2023-10-01 ASSESSMENT — LIFESTYLE VARIABLES
HOW MANY STANDARD DRINKS CONTAINING ALCOHOL DO YOU HAVE ON A TYPICAL DAY: PATIENT DOES NOT DRINK
HOW OFTEN DO YOU HAVE A DRINK CONTAINING ALCOHOL: NEVER

## 2023-10-01 NOTE — ED NOTES
Pt wife screaming HELP and yells that he is having a panic attack. Pt is redirected to his bed and wife is in waiting room at this time. Police at bedside.  Dr Zadii Flight in to see patient     Sravani De León RN  10/01/23 1924

## 2023-10-01 NOTE — ED NOTES
Pt sleeping at this time, sitter at bedside.  Will continue to monitor     Jenny Gracia RN  10/01/23 1949

## 2023-10-01 NOTE — ED NOTES
Tele consent and pink slip faxed to [facility]. Spoke with Hope at [facility].      Ovidio Samson RN  10/01/23 4582

## 2023-10-02 ENCOUNTER — HOSPITAL ENCOUNTER (INPATIENT)
Age: 58
LOS: 1 days | Discharge: HOME | End: 2023-10-03
Attending: PSYCHIATRY & NEUROLOGY | Admitting: PSYCHIATRY & NEUROLOGY
Payer: COMMERCIAL

## 2023-10-02 VITALS
DIASTOLIC BLOOD PRESSURE: 84 MMHG | TEMPERATURE: 98.1 F | HEIGHT: 74 IN | BODY MASS INDEX: 36.57 KG/M2 | WEIGHT: 285 LBS | HEART RATE: 86 BPM | SYSTOLIC BLOOD PRESSURE: 122 MMHG | OXYGEN SATURATION: 98 % | RESPIRATION RATE: 16 BRPM

## 2023-10-02 PROBLEM — T14.91XA SUICIDE ATTEMPT (HCC): Status: ACTIVE | Noted: 2023-10-02

## 2023-10-02 PROBLEM — F42.2 MIXED OBSESSIONAL THOUGHTS AND ACTS: Status: ACTIVE | Noted: 2023-10-02

## 2023-10-02 PROBLEM — R45.851 SUICIDAL IDEATIONS: Status: RESOLVED | Noted: 2023-10-02 | Resolved: 2023-10-02

## 2023-10-02 PROBLEM — T14.91XA SUICIDE ATTEMPT (HCC): Status: RESOLVED | Noted: 2023-10-02 | Resolved: 2023-10-02

## 2023-10-02 PROBLEM — R45.851 SUICIDAL IDEATIONS: Status: ACTIVE | Noted: 2023-10-02

## 2023-10-02 LAB
BILIRUB UR QL STRIP: NEGATIVE
CLARITY UR: CLEAR
COLOR UR: YELLOW
COMMENT: ABNORMAL
GLUCOSE UR STRIP-MCNC: NEGATIVE MG/DL
HGB UR QL STRIP.AUTO: NEGATIVE
KETONES UR STRIP-MCNC: NEGATIVE MG/DL
LEUKOCYTE ESTERASE UR QL STRIP: NEGATIVE
NITRITE UR QL STRIP: NEGATIVE
PH UR STRIP: 6 [PH] (ref 5–9)
PROT UR STRIP-MCNC: NEGATIVE MG/DL
SP GR UR STRIP: >1.03 (ref 1–1.03)
UROBILINOGEN UR STRIP-ACNC: 0.2 EU/DL (ref 0–1)

## 2023-10-02 PROCEDURE — 1240000000 HC EMOTIONAL WELLNESS R&B

## 2023-10-02 PROCEDURE — 6370000000 HC RX 637 (ALT 250 FOR IP): Performed by: PSYCHIATRY & NEUROLOGY

## 2023-10-02 PROCEDURE — 6370000000 HC RX 637 (ALT 250 FOR IP): Performed by: STUDENT IN AN ORGANIZED HEALTH CARE EDUCATION/TRAINING PROGRAM

## 2023-10-02 PROCEDURE — 6370000000 HC RX 637 (ALT 250 FOR IP): Performed by: NURSE PRACTITIONER

## 2023-10-02 PROCEDURE — 81003 URINALYSIS AUTO W/O SCOPE: CPT

## 2023-10-02 PROCEDURE — 90792 PSYCH DIAG EVAL W/MED SRVCS: CPT | Performed by: NURSE PRACTITIONER

## 2023-10-02 RX ORDER — ENOXAPARIN SODIUM 100 MG/ML
30 INJECTION SUBCUTANEOUS 2 TIMES DAILY
Status: DISCONTINUED | OUTPATIENT
Start: 2023-10-02 | End: 2023-10-02 | Stop reason: HOSPADM

## 2023-10-02 RX ORDER — ACETAMINOPHEN 325 MG/1
650 TABLET ORAL EVERY 4 HOURS PRN
Status: DISCONTINUED | OUTPATIENT
Start: 2023-10-02 | End: 2023-10-02 | Stop reason: HOSPADM

## 2023-10-02 RX ORDER — RISPERIDONE 1 MG/1
1 TABLET ORAL NIGHTLY
Status: DISCONTINUED | OUTPATIENT
Start: 2023-10-02 | End: 2023-10-03 | Stop reason: HOSPADM

## 2023-10-02 RX ORDER — MAGNESIUM HYDROXIDE/ALUMINUM HYDROXICE/SIMETHICONE 120; 1200; 1200 MG/30ML; MG/30ML; MG/30ML
30 SUSPENSION ORAL PRN
Status: DISCONTINUED | OUTPATIENT
Start: 2023-10-02 | End: 2023-10-02 | Stop reason: HOSPADM

## 2023-10-02 RX ORDER — HALOPERIDOL 5 MG/ML
3 INJECTION INTRAMUSCULAR EVERY 6 HOURS PRN
Status: DISCONTINUED | OUTPATIENT
Start: 2023-10-02 | End: 2023-10-02 | Stop reason: HOSPADM

## 2023-10-02 RX ORDER — LANOLIN ALCOHOL/MO/W.PET/CERES
6 CREAM (GRAM) TOPICAL ONCE
Status: COMPLETED | OUTPATIENT
Start: 2023-10-02 | End: 2023-10-02

## 2023-10-02 RX ORDER — LANOLIN ALCOHOL/MO/W.PET/CERES
3 CREAM (GRAM) TOPICAL NIGHTLY PRN
Status: DISCONTINUED | OUTPATIENT
Start: 2023-10-02 | End: 2023-10-03 | Stop reason: HOSPADM

## 2023-10-02 RX ORDER — NICOTINE 21 MG/24HR
1 PATCH, TRANSDERMAL 24 HOURS TRANSDERMAL DAILY
Status: DISCONTINUED | OUTPATIENT
Start: 2023-10-02 | End: 2023-10-02 | Stop reason: HOSPADM

## 2023-10-02 RX ORDER — ACETAMINOPHEN 325 MG/1
650 TABLET ORAL EVERY 4 HOURS PRN
Status: DISCONTINUED | OUTPATIENT
Start: 2023-10-02 | End: 2023-10-03 | Stop reason: HOSPADM

## 2023-10-02 RX ORDER — CLONAZEPAM 0.5 MG/1
0.5 TABLET ORAL ONCE
Status: COMPLETED | OUTPATIENT
Start: 2023-10-02 | End: 2023-10-02

## 2023-10-02 RX ORDER — MAGNESIUM HYDROXIDE/ALUMINUM HYDROXICE/SIMETHICONE 120; 1200; 1200 MG/30ML; MG/30ML; MG/30ML
30 SUSPENSION ORAL PRN
Status: DISCONTINUED | OUTPATIENT
Start: 2023-10-02 | End: 2023-10-03 | Stop reason: HOSPADM

## 2023-10-02 RX ORDER — LANOLIN ALCOHOL/MO/W.PET/CERES
3 CREAM (GRAM) TOPICAL NIGHTLY PRN
Status: DISCONTINUED | OUTPATIENT
Start: 2023-10-02 | End: 2023-10-02 | Stop reason: HOSPADM

## 2023-10-02 RX ORDER — NICOTINE 21 MG/24HR
1 PATCH, TRANSDERMAL 24 HOURS TRANSDERMAL DAILY
Status: DISCONTINUED | OUTPATIENT
Start: 2023-10-02 | End: 2023-10-03 | Stop reason: HOSPADM

## 2023-10-02 RX ADMIN — MELATONIN 3 MG ORAL TABLET 3 MG: 3 TABLET ORAL at 21:35

## 2023-10-02 RX ADMIN — RISPERIDONE 1 MG: 1 TABLET ORAL at 21:35

## 2023-10-02 RX ADMIN — Medication 6 MG: at 01:50

## 2023-10-02 RX ADMIN — CLONAZEPAM 0.5 MG: 0.5 TABLET ORAL at 12:27

## 2023-10-02 RX ADMIN — SERTRALINE 100 MG: 50 TABLET, FILM COATED ORAL at 12:27

## 2023-10-02 ASSESSMENT — PATIENT HEALTH QUESTIONNAIRE - PHQ9
SUM OF ALL RESPONSES TO PHQ QUESTIONS 1-9: 2
1. LITTLE INTEREST OR PLEASURE IN DOING THINGS: 0
2. FEELING DOWN, DEPRESSED OR HOPELESS: 2
SUM OF ALL RESPONSES TO PHQ QUESTIONS 1-9: 2
SUM OF ALL RESPONSES TO PHQ9 QUESTIONS 1 & 2: 2
SUM OF ALL RESPONSES TO PHQ QUESTIONS 1-9: 2
SUM OF ALL RESPONSES TO PHQ QUESTIONS 1-9: 2

## 2023-10-02 ASSESSMENT — LIFESTYLE VARIABLES
HOW OFTEN DO YOU HAVE A DRINK CONTAINING ALCOHOL: NEVER
HOW OFTEN DO YOU HAVE A DRINK CONTAINING ALCOHOL: NEVER
HOW MANY STANDARD DRINKS CONTAINING ALCOHOL DO YOU HAVE ON A TYPICAL DAY: PATIENT DOES NOT DRINK
HOW MANY STANDARD DRINKS CONTAINING ALCOHOL DO YOU HAVE ON A TYPICAL DAY: PATIENT DOES NOT DRINK

## 2023-10-02 ASSESSMENT — PAIN SCALES - GENERAL: PAINLEVEL_OUTOF10: 0

## 2023-10-02 ASSESSMENT — SLEEP AND FATIGUE QUESTIONNAIRES
AVERAGE NUMBER OF SLEEP HOURS: 7
DO YOU HAVE DIFFICULTY SLEEPING: NO
DO YOU USE A SLEEP AID: YES
DO YOU HAVE DIFFICULTY SLEEPING: NO
DO YOU USE A SLEEP AID: YES
SLEEP PATTERN: NORMAL
AVERAGE NUMBER OF SLEEP HOURS: 7

## 2023-10-02 NOTE — ED NOTES
This RN called Yoanna Belcher to inquire why Dr. Harleen English was putting in orders on patient as this RN, as well as the patient and family were under the impression the patient would be going to [facility]. Per Yoanna Belcher she spoke with a staff member who said the patient is from out of town and ok with transfer. This RN called Priti De La O the patients wife and explained the situation, informed her the patient does have a bed downtown. Priti De La O states she was under the impression the patient would be referred to [facility] first and if he was denied he would be referred to [facility]. She would like the patient to be referred to [facility] first and if they deny the patient they are willing for him to go to [facility].       Meri Panda RN  10/02/23 8957

## 2023-10-02 NOTE — ED NOTES
Spoke with Timothy Aden about potential to get patient admitted to [facility]. Timothy Aden states they will text their supervisor and see how to go about patient's wife wanting patient to go to [facility] instead of being admitted downtown.  Ector tapia will call back and inform [facility] ED.      Angie Lopez RN  10/02/23 1923

## 2023-10-02 NOTE — ED NOTES
Family from bedside going to stay in a hotel, asked to call with any update. Numbers up to date and verified in the chart.       Marly Shore RN  10/02/23 8887

## 2023-10-02 NOTE — ED NOTES
SW was informed by [facility] AN AFFILIATE OF [facility] ALEX Posada that pt was accepted for [facility] admission. Spoke to [facility] in admitting, pt assigned to bed 7305A.      Diego Gallardo, [address] [place] [address]  10/02/23 9847

## 2023-10-02 NOTE — ED NOTES
Spoke with Dr Jorge Ruiz, requested she speak briefly with pt or have nurse speak with him. Explained that in SW assessment pt does not appear to meet in-patient criteria. Pt pink slipped by Dr Luis Alfredo Castellanos for major depression with suicidal ideation. Pt in SW interview denies suicidal ideation intent or plan, denies hx of attempts, denies homicidal ideation intent or plan, no acute psychotic symptoms evident, denies significant symptoms of depression. Pt explains his OCD compulsive behavior is what brought him in tonight. Dr Jorge Ruiz agreeable to speaking with pt, SW will call ED back after note finished to determine course of action. [address], [place], [place]  10/01/23 [address] [place], [place]  10/01/23 0837    Spoke again with Dr Jorge Ruiz who reports she has spoken with both the pt and the wife. Pt is not voicing any suicidal ideation intent or plan. Wife states to Dr Jorge Ruiz that pt has not made any suicidal statements to her and she is not concerned about this. Reports to Dr Jorge Ruiz the reason she is concerned about leaving him alone is not suicide but his OCD behaviors which have gotten significantly worse. Reports to Dr Jorge Ruiz that they both, wife and patient, feel he needs intensive care for his OCD and they called [facility] who they report told them pt has to be medically cleared first then referred to [facility]. Discussed with Dr Jorge Ruiz that pt does not appear to meet in-patient criteria and SW is unsure [facility] would admit him. However pt is on a pink slip by earlier shift ED doc so this may influence the admission decision. Dr Jorge Ruiz requested that since both pt and wife are specifically requesting this pt can be referred to access center to see if he can be admitted to [facility].  SW will complete referral.          [address], [place], [facility]  10/01/23 [address]  10/01/23 2531

## 2023-10-02 NOTE — CARE COORDINATION
SW attempted to complete assessment. Pt stated that his family is going to be coming for visitation any minute and he is nervous about them not showing up. SW provided emotional support to the pt and pt reported that he over thinks a lot and he gets upset. Pt stated that he is also nervous about them leaving. SW informed pt that SW will come to talk to him after his family leaves. Pt was thankful. RN stated that she will speak with NP about getting a medication for anxiety.

## 2023-10-02 NOTE — ED NOTES
SW contacted [place], attempted to speak to Dr. Taylor Valente (not available). ALEX Escobedo was informed still needing urinalysis for pt. Discussed option of referral to [address] [place] [address]. RN stated she put in an order for urinalysis and MD will be updated.      Lola Sheffield, JENNIFER  10/02/23 0041

## 2023-10-02 NOTE — ED NOTES
Pt transferred to [facility] [place] [facility]\"". Pt gave cell phone to wife. EMS provided with belonging bags.      Sravani Santoyo RN  10/02/23 3518

## 2023-10-02 NOTE — ED NOTES
Spoke with patient's wife and they are okay for patient to go downtown. This RN then spoke to [employer] and let her know that wife is okay with going [facility]. Admission continues as was.       Angie Lopez RN  10/02/23 2839

## 2023-10-02 NOTE — H&P
for: \"LITHIUM\", \"VALPROATE\"      Radiology No results found. TREATMENT PLAN:  The patient's diagnosis, treatment plan, medication management were formulated after patient was seen directly by the attending physician and myself and all relevant documentation was reviewed. Risk Management: Based on the diagnosis and assessment biopsychosocial treatment model was presented to the patient and was given the opportunity to ask any question. The patient was agreeable to the plan and all the patient's questions were answered to the patient's satisfaction. I discussed with the patient the risk, benefit, alternative and common side effects for the proposed medication treatment. The patient is consenting to this treatment. Risk, benefit, side effects, possible outcomes of the medication and alternatives discussed with the patient and the patient demonstrated understanding. The patient was also educated that the outcome of treatment will depend on the medication compliance as directed by the prescribers along with regular follow-up, compliance with the labs and other work-up, as clinically indicated. The patients risk factors have been mitigated as they have been admitted to inpatient behavioral health in an emotionally supportive environment with q 15 minute safety checks. Okay to discontinue the 1:1, low risk for suicide. They have the following:  Risk Factors: access to a gun  Increased worry/panic   Family history of OCD  Recent medication change     Protective Factors: Has an out-patient provider  Initiated this ED visit  Safe and stable housing  Access to essential needs  Medication compliance   Spiritual/Restoration  No history of suicide attempts  Educated   Supportive family      Collateral Information:  Will obtain collateral information from the family or friends.   Will obtain medical records as appropriate from out patient providers  Will consult the hospitalist for a physical exam to rule out any co-morbid physical condition. Home medication Reconciled   Reviewed and continued as clinically indicated    New Medications started during this admission :    Zoloft 100 mg daily  Risperdal 1 mg nightly  Discontinue prozac    Prn Haldol 5mg and Vistaril 50mg q6hr for extreme agitation. Trazodone as ordered for insomnia  Vistaril as ordered for anxiety      Psychotherapy:   Encourage participation in milieu and group therapy  Individual therapy as needed    NOTE: This report was transcribed using voice recognition software. Every effort was made to ensure accuracy; however, inadvertent computerized transcription errors may be present. Behavioral Services  Medicare Certification Upon Admission    I certify that this patient's inpatient psychiatric hospital admission is medically necessary for:    [x] (1) Treatment which could reasonably be expected to improve this patient's condition,       [x] (2) Or for diagnostic study;     AND     [x](2) The inpatient psychiatric services are provided while the individual is under the care of a physician and are included in the individualized plan of care.     Estimated length of stay/service 1 - 3 days based on stability     Plan for post-hospital care follow with OP provider     Electronically signed by LOVE Qureshi CNP on 10/2/2023 at 10:56 AM        Electronically signed by LOVE Qureshi CNP on 10/2/2023 at 10:56 AM

## 2023-10-02 NOTE — ED NOTES
Family at bedside, patient resting comfortably no complaints at this time.       Marly Shore RN  10/02/23 0650

## 2023-10-02 NOTE — ED NOTES
Behavioral Health Crisis Assessment    Telehealth consent signed by patient and received. Behavioral Health Crisis Assessment completed via telehealth Ipad. Chief Complaint: \"My OCD behavior is really getting out of control. I call my adult children multiple times during the day to check on them. \"    Mental Status Exam: Alert, oriented x4, mood neutral, affect is somewhat anxious but well within normal limits, eye contact good, speech normal in rate flow and volume, behavior is cooperative, appropriate, no signs of agitation, thought form logical, organized, thought content appears clear, denies A/V hallucinations, delusions, paranoia, none evident in interview. Denies suicidal ideation, intent or plan, denies hx of attempts, denies homicidal ideation intent or plan. Does report that at some point in his life he has had brief thoughts of suicide but states: \"I think everybody has those at one time of their life or another. \"     Legal Status:  [] Voluntary:  [x] Involuntary, Issued by: ED doc    Gender:  [x] Male [] Female [] Transgender  [] Other    Sexual Orientation:  [x] Heterosexual [] Homosexual [] Bisexual [] Other    Brief Clinical Summary:Pt is a 60 yo male who presents to the ED as a walk-in, pt is on a pink slip by the ED doctor. Pt reports he is open at [facility], sees a counselor and a meds prescriber there. Reports he was on Zoloft for his compulsive actions and it seemed to be helpful \". .. to a degree. It kept them in check I guess. \" Reports recently switched to Prozac and OCD behaviors have begun again and at this time he feels he has no control over them. Reports they are intrusive into his children's lives. Also reports he has begun to experience panic attacks more recently. Does report he is also prescribed Abilify and Xanax as well as the Prozac.   Pt denies hx of in-patient psychiatric admission, reports he has been diagnosed at the [address] with OCD, hx of

## 2023-10-02 NOTE — ED NOTES
SW got direction from [facility] that it is up to [place] regarding how they want to proceed. Director informed SW that [place] would have to contact the [facility] regarding the referral preference for [facility]. ALEX Meyer was informed, contacted pt wife who said it was ok to proceed with [facility] admission.      [place]  10/02/23 1775

## 2023-10-02 NOTE — ED NOTES
CO present, patient resting on left side, eyes closed, no complaints.       Clara Krishnan RN  10/02/23 9078

## 2023-10-02 NOTE — BH NOTE
Patient presented to Dr. Patrick Hanson; patient accepted. Per [facility] and vistaril removed form admission order set due to QTc  497. SW notified.

## 2023-10-02 NOTE — CARE COORDINATION
Biopsychosocial Assessment Note    Social work met with patient to complete the biopsychosocial assessment and C-SSRS. Chief Complaint: pt reported that he is currently in the hospital because \"I was checking on my children because I was worried about them multiple times per day and they are adults. I was calling and texting them worried\"     Mental Status Exam: Pt is alert and oriented x4. Pt's mood is anxious and depressed/ tearful, affect is worried but congruent. Pt's speech is clear, rate is normal and volume is average. Pt's eye contact is fair. Pt's thoughts process is preservation, thought content is preoccupied. Pt's memory is intact, pt is a good historian. Pt's insight and judgement is fair. Pt was tearful, calm and friendly during assessment and offered good information. Pt denied SI, HI, AVH. Clinical Summary: Pt is a 22-year-old male, who presented to the ER due to increased anxiety and depression due to constant worry about children. Per ED notes, \"My OCD behavior is really getting out of control. I call my adult children multiple times during the day to check on them. \"    Pt denied any previous history of inpatient mental health hospitalizations. Pt stated that he is currently active with the counseling center in [address] [place] [address] for mental health services. Pt stated that he has been compliant with his medications however was unable to recall the medications that he has been on. Pt denied any history of trauma or abuse. Pt stated that his main stressor at this time is that \"my children are all grown up and I miss being a dad. \" Pt stated that he is very worried about his children's safety and that is why he is checking on them multiple times per day. Pt stated that he has no history of suicidal ideations, plans or attempts to end his life. Pt denied any history of self harm. Pt stated that he has no substance use. Pt stated that his sleep has been good and his appetite has been decreased.

## 2023-10-02 NOTE — ED NOTES
Call to [place], spoke with [facility]. Reviewed case, Quang Martinez reports it does not appear that pt meets admission criteria, reviewed Dr Shirley Lind note and the fact that he put pt on a pink slip. Quang Graynaren said she wanted to discuss with her charge nurse whether or not this was an appropriate referral.  Will call us back. [address], [place], [place]  10/01/23 8151    Quang Martinez reports there are concerns about the lack of criteria but that the decision to refer or not refer is up to us. Upon reviewing chart it was noted that pt has no urinalysis.   Call to [facility], spoke with Quirino Colon, requested Urinalysis for [email or web address] referral.      [address], [place], [place]  10/01/23 0119

## 2023-10-03 VITALS
TEMPERATURE: 98.2 F | SYSTOLIC BLOOD PRESSURE: 120 MMHG | HEART RATE: 94 BPM | OXYGEN SATURATION: 97 % | DIASTOLIC BLOOD PRESSURE: 90 MMHG | WEIGHT: 285 LBS | BODY MASS INDEX: 36.57 KG/M2 | RESPIRATION RATE: 16 BRPM | HEIGHT: 74 IN

## 2023-10-03 PROCEDURE — 6370000000 HC RX 637 (ALT 250 FOR IP): Performed by: NURSE PRACTITIONER

## 2023-10-03 PROCEDURE — 99239 HOSP IP/OBS DSCHRG MGMT >30: CPT | Performed by: NURSE PRACTITIONER

## 2023-10-03 RX ORDER — RISPERIDONE 1 MG/1
1 TABLET ORAL NIGHTLY
Qty: 30 TABLET | Refills: 0 | Status: SHIPPED | OUTPATIENT
Start: 2023-10-03 | End: 2023-11-02

## 2023-10-03 RX ORDER — SERTRALINE HYDROCHLORIDE 100 MG/1
100 TABLET, FILM COATED ORAL DAILY
Qty: 30 TABLET | Refills: 0 | Status: SHIPPED | OUTPATIENT
Start: 2023-10-04 | End: 2023-11-03

## 2023-10-03 RX ADMIN — SERTRALINE 100 MG: 50 TABLET, FILM COATED ORAL at 08:58

## 2023-10-03 ASSESSMENT — PAIN SCALES - GENERAL: PAINLEVEL_OUTOF10: 0

## 2023-10-03 NOTE — PLAN OF CARE
Problem: Depression  Goal: Will be euthymic at discharge  Description: INTERVENTIONS:  1. Administer medication as ordered  2. Provide emotional support via 1:1 interaction with staff  3. Encourage involvement in milieu/groups/activities  4. Monitor for social isolation  10/3/2023 0116 by Sadia Garza RN  Outcome: Not Progressing  10/2/2023 1247 by Burke Montgomery RN  Outcome: Progressing     Problem: Behavior  Goal: Pt/Family maintain appropriate behavior and adhere to behavioral management agreement, if implemented  Description: INTERVENTIONS:  1. Assess patient/family's coping skills and  non-compliant behavior (including use of illegal substances)  2. Notify security of behavior or suspected illegal substances which indicate the need for search of the family and/or belongings  3. Encourage verbalization of thoughts and concerns in a socially appropriate manner  4. Utilize positive, consistent limit setting strategies supporting safety of patient, staff and others  5. Encourage participation in the decision making process about the behavioral management agreement  6. If a visitor's behavior poses a threat to safety call refer to organization policy. 7. Initiate consult with , Psychosocial CNS, Spiritual Care as appropriate  10/3/2023 0116 by Sadia Garza RN  Outcome: Not Progressing  10/2/2023 1247 by Burke Montgomery RN  Outcome: Progressing     Problem: Anxiety  Goal: Will report anxiety at manageable levels  Description: INTERVENTIONS:  1. Administer medication as ordered  2. Teach and rehearse alternative coping skills  3. Provide emotional support with 1:1 interaction with staff  10/3/2023 0116 by Sadia Garza RN  Outcome: Not Progressing  10/2/2023 1247 by Burke Montgomery RN  Outcome: Progressing  Patient isolative to his room. Remains preoccupied,worried about being here and not at home with family.

## 2023-10-03 NOTE — DISCHARGE SUMMARY
DISCHARGE SUMMARY      Patient ID:  Molly Collins  [id number]  59 y.o.  1965    Admit date: 10/2/2023    Discharge date and time: 10/3/2023    Admitting Physician: Kirsty Bagley MD     Discharge Physician: Dr Siva Rodriguez MD    Discharge Diagnoses:   Patient Active Problem List   Diagnosis    Hyperlipidemia    Osteoarthrosis    Hypertension    Erectile dysfunction    Chronic pain    Tachycardia    Class 3 severe obesity due to excess calories with body mass index (BMI) of 40.0 to 44.9 in adult [facility])    Restless leg    MAURICIO (generalized anxiety disorder)    Anxiety    Cellulitis and abscess of left leg    History of colonic polyps    Hypogonadism in male    Obstructive sleep apnea syndrome    Stress at work    Mixed obsessional thoughts and acts       Admission Condition: poor    Discharged Condition: stable    Admission Circumstance:   Patient presented to [facility] for medical clearance to go to [facility], he was pink slipped in the ER at [place], referred to [facility] after being medically cleared for admission      PAST MEDICAL/PSYCHIATRIC HISTORY:   Past Medical History:   Diagnosis Date    Anxiety     Carpal tunnel syndrome of right wrist     Cervical radiculopathy 04/11/2016    recurrent    Chronic pain     Erectile dysfunction     Hyperlipidemia     Hypertension     Hypogonadism in male 12/20/2017    Lumbar strain 12/13/2016    recurrent    Osteoarthritis     Panic disorder 07/23/2014    Restless legs syndrome     Sleep apnea     Tachycardia 07/23/2014       FAMILY/SOCIAL HISTORY:  Family History   Problem Relation Age of Onset    Mental Illness Father         anxiety    Coronary Art Dis Father      Social History     Socioeconomic History    Marital status:      Spouse name: Not on file    Number of children: Not on file    Years of education: Not on file    Highest education level: Not on file   Occupational History    Not on file   Tobacco Use    Smoking status: Never

## 2023-10-03 NOTE — CARE COORDINATION
Collateral Contact (KATELYNN signed)  Name: Mallory Teran  Relationship: wife  Number: [phone]     Collateral Information: SW called pt's wife Mallory Teran to obtain collateral information and discuss discharge planning. NATHEN spoke with Mallory Teran who stated that the pt is currently in the hospital because he has been struggling with OCD, anxiety and depression. Mallory Teran stated that pt obsessed on thoughts and he worries and they have been getting so bad that he is not able to function. Mallory Teran stated that the pt is very worried about his children's safety and he has been tracking their phones and he is constantly calling and texting them and if they do not answer or the location is not loading he starts to panic and think that something is wrong or they are dead. Wife stated that over the past 6 months it has gotten a lot worse. Mallory Teran stated that the pt is not able to function in everyday life and he has not been able to work. Wife stated that nothing happened with the children to cause this. Wife stated that the pt got a medication change 2 months ago, he was on Zoloft for 3 years and they felt that he was use to the medication and they changed it to Prozac and it got worse. Mallory Teran stated that she feels that the Prozac is making the worse and having the opposite effect on the pt. Mallory Teran stated the pt is able to return home and she will pick him up from the hospital. She stated that she will be here at 12 for visitation. Wife stated that the pt continues to question if she is still coming today. Wife stated that the pt has a gun locked in a safe and the pt has the combination. Wife stated that they have talked about it and the pt wouldn't do anything to harm himself. Wife stated that she will change the combination to the gun safe so pt does not have access to it. Wife stated that yesterday someone talked to him about him being discharged today and follow up with outpatient therapy.  Wife stated that she is unsure if the pt is ready to come home and she would have to see how he is doing after the visit. Wife stated that the pt called her today and was worried that she is not going to come today. Access to Weapons per Collateral Contact: [] Reports [x] Denies     SW called the [address] in [place] to schedule follow up appointments. SW was informed that pt has an appointment on 10/25 with Beverley Cardenas at 10 AM for counseling, SW was informed that the counselor is currently out of the country and this appointment can not be scheduled sooner. SW was informed that pt has a medication management appointment 10/31 at 2:45 PM, SW left a message to schedule this appointment sooner.      [address] [place] [address] Street   [address] , [address] [place] [address] [place] [address]   Phone: [phone]   Fax: [phone]

## 2023-10-03 NOTE — PLAN OF CARE
Problem: Risk for Elopement  Goal: Patient will not exit the unit/facility without proper excort  10/3/2023 1102 by Dread Chambers RN  Outcome: Progressing     Problem: Self Harm/Suicidality  Goal: Will have no self-injury during hospital stay  Description: INTERVENTIONS:  1. Ensure constant observer at bedside with Q15M safety checks  2. Maintain a safe environment  3. Secure patient belongings  4. Ensure family/visitors adhere to safety recommendations  5. Ensure safety tray has been added to patient's diet order  6. Every shift and PRN: Re-assess suicidal risk via Frequent Screener    10/3/2023 1102 by Dread Chambers RN  Outcome: Progressing     Problem: Depression  Goal: Will be euthymic at discharge  Description: INTERVENTIONS:  1. Administer medication as ordered  2. Provide emotional support via 1:1 interaction with staff  3. Encourage involvement in milieu/groups/activities  4. Monitor for social isolation  10/3/2023 1102 by Dread Chambers RN  Outcome: Progressing     Problem: Behavior  Goal: Pt/Family maintain appropriate behavior and adhere to behavioral management agreement, if implemented  Description: INTERVENTIONS:  1. Assess patient/family's coping skills and  non-compliant behavior (including use of illegal substances)  2. Notify security of behavior or suspected illegal substances which indicate the need for search of the family and/or belongings  3. Encourage verbalization of thoughts and concerns in a socially appropriate manner  4. Utilize positive, consistent limit setting strategies supporting safety of patient, staff and others  5. Encourage participation in the decision making process about the behavioral management agreement  6. If a visitor's behavior poses a threat to safety call refer to organization policy.   7. Initiate consult with , Psychosocial CNS, Spiritual Care as appropriate  10/3/2023 1102 by Dread Chambers RN  Outcome: Progressing     Problem:

## 2023-10-03 NOTE — CARE COORDINATION
VINAY met with pt to discuss discharge for today, pt was seen in his room and he was working on a puzzle. Pt stated that he was told he is being discharged today and he would like to finish the puzzle before he leaves. Pt reported that he will be discharging to his home with his wife and his wife will be picking home up from the hospital at 12 PM when she comes for visitation. Pt stated that he is looking forward to going home and getting back to work. Pt reported to feeling a lot better today. Pt is alert and oriented x4. Pt's mood is bright and happy, affect is congruent. Pt's speech is clear, rate and volume is normal. Pt's insight and judgement is improved. Pt denied depression and anxiety. Pt denied SI, HI, AVH. Pt reported that he thinks the Prozac is toxic to his body and he wants to stay on the Zoloft. Pt stated that he is not breaking down or over thinking and he is looking forward to his outpatient appointments. 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 VINAY called pt's wife Rosita Mendoza [phone] to inform her of discharge today. Rosita Mendoza stated that she will be coming at 15 PM to pick the pt up from the hospital. Rosita Mendoza denied having questions/concerns at this time. Pt has follow up appointments scheduled at the counseling center. Pt has a counseling appointment on 10/25 and medication management on 10/9.     In order to ensure appropriate transition and discharge planning is in place, the following documents have been transmitted to The counseling center as the new outpatient provider:    The d/c diagnosis was transmitted to the next care provider  The reason for hospitalization was transmitted to the next care provider  The d/c medications (dosage and indication) were transmitted to the next care provider   The continuing care plan was transmitted to the next care provider     The [address]

## 2023-10-11 DIAGNOSIS — I10 PRIMARY HYPERTENSION: ICD-10-CM

## 2023-10-11 DIAGNOSIS — M15.9 PRIMARY OSTEOARTHRITIS INVOLVING MULTIPLE JOINTS: ICD-10-CM

## 2023-10-11 DIAGNOSIS — E29.1 HYPOGONADISM IN MALE: ICD-10-CM

## 2023-10-11 DIAGNOSIS — E78.2 MIXED HYPERLIPIDEMIA: ICD-10-CM

## 2023-10-11 DIAGNOSIS — G25.81 RESTLESS LEG: ICD-10-CM

## 2023-10-11 RX ORDER — ALPRAZOLAM 0.25 MG/1
0.25 TABLET ORAL DAILY PRN
Qty: 30 TABLET | Refills: 0 | Status: SHIPPED | OUTPATIENT
Start: 2023-10-11 | End: 2023-11-10

## 2023-10-11 NOTE — TELEPHONE ENCOUNTER
Last Appointment:  8/14/2023  Future Appointments   Date Time Provider [address]   10/20/2023  9:00 AM Josi Saul  [employer]   11/14/2023  3:30 PM [facility] Saul  [facility]

## 2023-10-11 NOTE — TELEPHONE ENCOUNTER
Patient was just hospitalized. He should be following with psych at this point. I will send in this prescription but he should get his for future prescriptions from psychiatry.

## 2023-10-20 ENCOUNTER — OFFICE VISIT (OUTPATIENT)
Dept: FAMILY MEDICINE CLINIC | Age: 58
End: 2023-10-20
Payer: COMMERCIAL

## 2023-10-20 VITALS
HEART RATE: 110 BPM | BODY MASS INDEX: 34.15 KG/M2 | OXYGEN SATURATION: 96 % | DIASTOLIC BLOOD PRESSURE: 100 MMHG | TEMPERATURE: 96.7 F | SYSTOLIC BLOOD PRESSURE: 140 MMHG | WEIGHT: 266 LBS

## 2023-10-20 DIAGNOSIS — I10 PRIMARY HYPERTENSION: ICD-10-CM

## 2023-10-20 DIAGNOSIS — Z56.6 STRESS AT WORK: ICD-10-CM

## 2023-10-20 DIAGNOSIS — F42.2 MIXED OBSESSIONAL THOUGHTS AND ACTS: ICD-10-CM

## 2023-10-20 DIAGNOSIS — F41.1 GAD (GENERALIZED ANXIETY DISORDER): ICD-10-CM

## 2023-10-20 DIAGNOSIS — R00.0 TACHYCARDIA: ICD-10-CM

## 2023-10-20 DIAGNOSIS — F41.9 ANXIETY: Primary | ICD-10-CM

## 2023-10-20 DIAGNOSIS — E78.2 MIXED HYPERLIPIDEMIA: ICD-10-CM

## 2023-10-20 PROCEDURE — 99214 OFFICE O/P EST MOD 30 MIN: CPT | Performed by: INTERNAL MEDICINE

## 2023-10-20 PROCEDURE — 3077F SYST BP >= 140 MM HG: CPT | Performed by: INTERNAL MEDICINE

## 2023-10-20 PROCEDURE — 3080F DIAST BP >= 90 MM HG: CPT | Performed by: INTERNAL MEDICINE

## 2023-10-20 PROCEDURE — 3017F COLORECTAL CA SCREEN DOC REV: CPT | Performed by: INTERNAL MEDICINE

## 2023-10-20 PROCEDURE — G8427 DOCREV CUR MEDS BY ELIG CLIN: HCPCS | Performed by: INTERNAL MEDICINE

## 2023-10-20 PROCEDURE — 1036F TOBACCO NON-USER: CPT | Performed by: INTERNAL MEDICINE

## 2023-10-20 PROCEDURE — G8417 CALC BMI ABV UP PARAM F/U: HCPCS | Performed by: INTERNAL MEDICINE

## 2023-10-20 PROCEDURE — G8484 FLU IMMUNIZE NO ADMIN: HCPCS | Performed by: INTERNAL MEDICINE

## 2023-10-20 PROCEDURE — 1111F DSCHRG MED/CURRENT MED MERGE: CPT | Performed by: INTERNAL MEDICINE

## 2023-10-20 RX ORDER — METOPROLOL SUCCINATE 50 MG/1
50 TABLET, EXTENDED RELEASE ORAL DAILY
Qty: 90 TABLET | Refills: 1 | Status: SHIPPED | OUTPATIENT
Start: 2023-10-20

## 2023-10-20 RX ORDER — VALSARTAN AND HYDROCHLOROTHIAZIDE 320; 25 MG/1; MG/1
TABLET, FILM COATED ORAL
Qty: 30 TABLET | Refills: 5 | Status: SHIPPED | OUTPATIENT
Start: 2023-10-20

## 2023-10-20 SDOH — HEALTH STABILITY - MENTAL HEALTH: OTHER PHYSICAL AND MENTAL STRAIN RELATED TO WORK: Z56.6

## 2023-10-20 NOTE — PROGRESS NOTES
19  Carlo Parmar : 1965 Sex: male  Age: 59 y.o. Chief Complaint   Patient presents with    Anxiety    Depression     Needs FMLA forms filled out         Patient was admitted to the hospital with severe depression, questionable suicidal ideation, severe obsessive thoughts, and work stress. Patient is concerned that his children are unsafe. Very irrational thinking at this point. He denies any has any suicidal plans. Currently seeing [facility]. He is no longer on a beta-blocker which I believe might be helpful especially given the fact that he is tachycardic but he is slightly hypertensive. The patient states that he is just excessively worried about his children. Hypertension  Associated symptoms include anxiety. Other    Hyperlipidemia    Anxiety        Depression      Review of Systems   Psychiatric/Behavioral:  Positive for depression.       Health Maintenance:  Colonoscopy Screening - (2015)-  Influenza Vaccination - (10/20/2020) REFUSES  Physical Exam - (2022)  Prostate Exam - (2022)  Psa Test -  RDREVNA  Rectal Exam - (2022)  Colonoscopy - (2015) KOLOZSI-NEXT 3/4/2022-next   Medical Problems:  Hypertension, Hypercholesterolemia, Osteoarthritis  Panic Disorder - (2014) RESTARTED ZOLOFT  Anxiety  Decreased Libido - (8/10/2012) EVALUATED BY DARREN  Cervical Radiculopathy - (2017) RECURRENT  Lumbar Strain - (2016) RECURRENT  Tachycardia - (2014) RESTARTED TOPROL  Hypogonadism - (2017) REPEAT [facility] work-up underway 2019  Restless Leg Syndrome - (2018) TRIAL REQUIP-increase Requip 2019  Carpal Tunnel Syndrome - (2018) RIGHT-EMG ORDERED  Right Knee Pain - (  Obesity longstanding  Low testosterone- Jody Sands- started therapy 2022  Severe depression and anxiety 10/1/2023 admitted to the hospital  Referral to Farnaz Cox 10/20/2023  SURGERIES;  Right knee arthroscopy  Reviewed, no

## 2023-12-16 DIAGNOSIS — M15.9 PRIMARY OSTEOARTHRITIS INVOLVING MULTIPLE JOINTS: ICD-10-CM

## 2023-12-16 RX ORDER — HYDROCODONE BITARTRATE AND ACETAMINOPHEN 7.5; 325 MG/1; MG/1
1 TABLET ORAL 2 TIMES DAILY PRN
Qty: 60 TABLET | Refills: 0 | Status: SHIPPED | OUTPATIENT
Start: 2023-12-16 | End: 2024-01-15

## 2024-01-18 ENCOUNTER — TELEPHONE (OUTPATIENT)
Dept: FAMILY MEDICINE CLINIC | Age: 59
End: 2024-01-18

## 2024-01-18 NOTE — TELEPHONE ENCOUNTER
----- Message from Nicole Edge sent at 1/18/2024 11:26 AM EST -----  Subject: Message to Provider    QUESTIONS  Information for Provider? Pts wife Priscilla is calling in to notify PCP Giorgio Caldwell that pt started the Ketamine Treatments 01/17/24, states pt ate   afterwards and is feeling sick that's why they cancelled for today   01/18/24.   ---------------------------------------------------------------------------  --------------  CALL BACK INFO  [phone]; OK to leave message on voicemail  ---------------------------------------------------------------------------  --------------  SCRIPT ANSWERS  Relationship to Patient? Spouse/Partner  Representative Name? Priscilla  Is the representative on the Communication Release of Information (KATELYNN)   form in Epic? Yes

## 2024-03-12 ENCOUNTER — OFFICE VISIT (OUTPATIENT)
Dept: FAMILY MEDICINE CLINIC | Age: 59
End: 2024-03-12
Payer: COMMERCIAL

## 2024-03-12 VITALS
OXYGEN SATURATION: 95 % | BODY MASS INDEX: 32.1 KG/M2 | TEMPERATURE: 98.8 F | WEIGHT: 250 LBS | DIASTOLIC BLOOD PRESSURE: 76 MMHG | HEART RATE: 87 BPM | SYSTOLIC BLOOD PRESSURE: 112 MMHG

## 2024-03-12 DIAGNOSIS — F33.2 SEVERE EPISODE OF RECURRENT MAJOR DEPRESSIVE DISORDER, WITHOUT PSYCHOTIC FEATURES (HCC): ICD-10-CM

## 2024-03-12 DIAGNOSIS — I10 PRIMARY HYPERTENSION: Primary | ICD-10-CM

## 2024-03-12 DIAGNOSIS — G89.29 OTHER CHRONIC PAIN: ICD-10-CM

## 2024-03-12 DIAGNOSIS — I10 PRIMARY HYPERTENSION: ICD-10-CM

## 2024-03-12 DIAGNOSIS — R00.0 TACHYCARDIA: ICD-10-CM

## 2024-03-12 DIAGNOSIS — E29.1 HYPOGONADISM IN MALE: ICD-10-CM

## 2024-03-12 DIAGNOSIS — M15.9 PRIMARY OSTEOARTHRITIS INVOLVING MULTIPLE JOINTS: ICD-10-CM

## 2024-03-12 DIAGNOSIS — F41.1 GAD (GENERALIZED ANXIETY DISORDER): ICD-10-CM

## 2024-03-12 DIAGNOSIS — E78.2 MIXED HYPERLIPIDEMIA: ICD-10-CM

## 2024-03-12 DIAGNOSIS — G47.33 OBSTRUCTIVE SLEEP APNEA SYNDROME: ICD-10-CM

## 2024-03-12 PROCEDURE — G8417 CALC BMI ABV UP PARAM F/U: HCPCS | Performed by: INTERNAL MEDICINE

## 2024-03-12 PROCEDURE — 3078F DIAST BP <80 MM HG: CPT | Performed by: INTERNAL MEDICINE

## 2024-03-12 PROCEDURE — 99214 OFFICE O/P EST MOD 30 MIN: CPT | Performed by: INTERNAL MEDICINE

## 2024-03-12 PROCEDURE — 3017F COLORECTAL CA SCREEN DOC REV: CPT | Performed by: INTERNAL MEDICINE

## 2024-03-12 PROCEDURE — 3074F SYST BP LT 130 MM HG: CPT | Performed by: INTERNAL MEDICINE

## 2024-03-12 PROCEDURE — G8427 DOCREV CUR MEDS BY ELIG CLIN: HCPCS | Performed by: INTERNAL MEDICINE

## 2024-03-12 PROCEDURE — 1036F TOBACCO NON-USER: CPT | Performed by: INTERNAL MEDICINE

## 2024-03-12 PROCEDURE — G8484 FLU IMMUNIZE NO ADMIN: HCPCS | Performed by: INTERNAL MEDICINE

## 2024-03-12 RX ORDER — LAMOTRIGINE 25 MG/1
25 TABLET ORAL DAILY
COMMUNITY

## 2024-03-12 RX ORDER — METOPROLOL SUCCINATE 50 MG/1
50 TABLET, EXTENDED RELEASE ORAL DAILY
Qty: 90 TABLET | Refills: 1 | Status: SHIPPED | OUTPATIENT
Start: 2024-03-12

## 2024-03-12 RX ORDER — HYDROCODONE BITARTRATE AND ACETAMINOPHEN 7.5; 325 MG/1; MG/1
1 TABLET ORAL EVERY 6 HOURS PRN
Qty: 120 TABLET | Refills: 0 | Status: SHIPPED | OUTPATIENT
Start: 2024-03-12 | End: 2024-04-11

## 2024-03-12 RX ORDER — CLONAZEPAM 1 MG/1
TABLET ORAL
COMMUNITY
Start: 2024-02-29

## 2024-03-12 RX ORDER — VALSARTAN AND HYDROCHLOROTHIAZIDE 320; 25 MG/1; MG/1
TABLET, FILM COATED ORAL
Qty: 30 TABLET | Refills: 5 | Status: SHIPPED | OUTPATIENT
Start: 2024-03-12

## 2024-03-12 NOTE — PROGRESS NOTES
19  Carlo Parmar : 1965 Sex: male  Age: 58 y.o.    Chief Complaint   Patient presents with    Follow-up         Patient has been evaluated by Africa Garza MD.  Multiple medication changes have been made along with ketamine infusions x 8.  Patient is receiving counseling on a weekly basis.  Patient is obsessed about possible arms occurring in his children.  All of the seem to be worsened by his daughters marriage in August.  Patient is afraid that even if he takes up any activity something will happen to his children.  Obviously this is very irrational thinking but he cannot dismiss these thoughts.  He denies cardiac symptoms such as chest pain, chest pressure, palpitations, shortness of breath, syncope or near syncope.  He has lost a significant amount of weight over the last 4 months.  Blood pressure here is well-controlled.  The patient did go off of his testosterone therapy mid summer last year.  Whether or not this has anything to do with his current problem is unknown but I will get a level and then discussed this with his psychiatrist.  The patient has not had suicidal or homicidal ideation.  We did discuss conservative measures including trying to increase exercise which I think would help him obsessed less and feel a little bit better about him self both mentally and physically.  I did discuss this with the wife and asked her to take him to the gym if he is willing to go and at least just walk for 20 or 30 minutes 3-4 times a week.    Hypertension  Associated symptoms include anxiety.   Other    Hyperlipidemia    Anxiety        Depression      Review of Systems  Health Maintenance:  Colonoscopy Screening - (2015)-  Influenza Vaccination - (10/20/2020) REFUSES  Physical Exam - (2022)  Prostate Exam - (2022)  Psa Test -  RDREVNA  Rectal Exam - (2022)  Colonoscopy - (2015) KOLOZSI-NEXT 3/4/2022-next   Medical Problems:  Hypertension, Hypercholesterolemia,

## 2024-03-13 LAB
ALBUMIN SERPL-MCNC: 4.4 G/DL (ref 3.5–5.2)
ALP BLD-CCNC: 80 U/L (ref 40–129)
ALT SERPL-CCNC: 18 U/L (ref 0–40)
ANION GAP SERPL CALCULATED.3IONS-SCNC: 12 MMOL/L (ref 7–16)
AST SERPL-CCNC: 18 U/L (ref 0–39)
BILIRUB SERPL-MCNC: 0.5 MG/DL (ref 0–1.2)
BUN BLDV-MCNC: 18 MG/DL (ref 6–23)
CALCIUM SERPL-MCNC: 10 MG/DL (ref 8.6–10.2)
CHLORIDE BLD-SCNC: 101 MMOL/L (ref 98–107)
CHOLESTEROL: 216 MG/DL
CO2: 27 MMOL/L (ref 22–29)
CREAT SERPL-MCNC: 1.6 MG/DL (ref 0.7–1.2)
GFR SERPL CREATININE-BSD FRML MDRD: 50 ML/MIN/1.73M2
GLUCOSE BLD-MCNC: 93 MG/DL (ref 74–99)
HDLC SERPL-MCNC: 31 MG/DL
LDL CHOLESTEROL: 134 MG/DL
POTASSIUM SERPL-SCNC: 4.4 MMOL/L (ref 3.5–5)
SODIUM BLD-SCNC: 140 MMOL/L (ref 132–146)
TOTAL PROTEIN: 7.2 G/DL (ref 6.4–8.3)
TRIGL SERPL-MCNC: 255 MG/DL
TSH SERPL DL<=0.05 MIU/L-ACNC: 1.24 UIU/ML (ref 0.27–4.2)
VLDLC SERPL CALC-MCNC: 51 MG/DL

## 2024-03-15 DIAGNOSIS — I10 PRIMARY HYPERTENSION: Primary | ICD-10-CM

## 2024-03-15 LAB
SEX HORMONE BINDING GLOBULIN: 52 NMOL/L (ref 19–76)
TESTOSTERONE FREE-NONMALE: 47.8 PG/ML (ref 47–244)
TESTOSTERONE TOTAL: 325 NG/DL (ref 193–740)
TESTOSTERONE, BIOAVAILABLE: 111.9 NG/DL (ref 130–680)

## 2024-04-25 ENCOUNTER — HOSPITAL ENCOUNTER (OUTPATIENT)
Dept: PSYCHIATRY | Age: 59
Setting detail: THERAPIES SERIES
Discharge: HOME | End: 2024-04-25

## 2024-04-25 NOTE — H&P
PSYCHIATRIC EVALUATION      CHIEF COMPLAINT:  \"Nothing is working.\"    HISTORY OF PRESENT ILLNESS: Carlo Parmar is a 58 y.o. male with history of treatment for depression and OCD who presents for psychiatric evaluation for possible TMS therapy. Patient is cooperative and provides good history. Wife present for session. Patient currently sees psychiatrist Dr. Blanton in [facility] and medication regimen consists of Zoloft 200 mg daily, Risperdal 1 mg at bed, Klonopin 1 mg bid and Lamictal (unknown dose). Wife believes current bout of depression started in September after their daughter got  and moved out of the house. Patient acknowledges ongoing symptoms of depressed mood, sleep disturbance, anhedonia, feelings of helplessness and hopelessness, anergia, anorexia, tearfulness, self-isolation and fleeting suicidal thoughts. He does not feel oral medications are working and had also tried ketamine infusions to no avail. Patient has been off work for six months due to mental health. In further review of symptoms patient endorses obsessive worries about his children and compulsively checks his phone and calls them to make sure they're ok. He has also been having panic attacks which he describes as feeling scared and a sense of doom for no reason. There is no history of elyse or psychosis. Patient is currently not suicidal or homicidal.     PAST PSYCHIATRIC HISTORY: Patient has a psychiatrist and therapist. One brief admission in October for SI. No history of attempts. Multiple medication trials in the past including Luvox, Abilify, Paxil and Prozac.     PAST MEDICAL HISTORY:       Diagnosis Date    Anxiety     Carpal tunnel syndrome of right wrist     Cervical radiculopathy 04/11/2016    recurrent    Chronic pain     Erectile dysfunction     Hyperlipidemia     Hypertension     Hypogonadism in male 12/20/2017    Lumbar strain 12/13/2016    recurrent    Osteoarthritis     Panic disorder 07/23/2014    
<<----- Click to add NO significant Past Surgical History

## 2024-05-23 RX ORDER — ONDANSETRON 4 MG/1
4 TABLET, ORALLY DISINTEGRATING ORAL
OUTPATIENT
Start: 2024-05-23

## 2024-06-04 DIAGNOSIS — G89.29 OTHER CHRONIC PAIN: ICD-10-CM

## 2024-06-04 RX ORDER — HYDROCODONE BITARTRATE AND ACETAMINOPHEN 7.5; 325 MG/1; MG/1
1 TABLET ORAL EVERY 6 HOURS PRN
Qty: 120 TABLET | Refills: 0 | Status: SHIPPED | OUTPATIENT
Start: 2024-06-04 | End: 2024-07-04

## 2024-08-06 ENCOUNTER — OFFICE VISIT (OUTPATIENT)
Dept: FAMILY MEDICINE CLINIC | Age: 59
End: 2024-08-06
Payer: COMMERCIAL

## 2024-08-06 VITALS
HEART RATE: 58 BPM | TEMPERATURE: 98.8 F | DIASTOLIC BLOOD PRESSURE: 64 MMHG | OXYGEN SATURATION: 94 % | BODY MASS INDEX: 31.07 KG/M2 | WEIGHT: 242 LBS | SYSTOLIC BLOOD PRESSURE: 100 MMHG

## 2024-08-06 DIAGNOSIS — E29.1 HYPOGONADISM IN MALE: ICD-10-CM

## 2024-08-06 DIAGNOSIS — I10 PRIMARY HYPERTENSION: ICD-10-CM

## 2024-08-06 DIAGNOSIS — E78.2 MIXED HYPERLIPIDEMIA: ICD-10-CM

## 2024-08-06 DIAGNOSIS — N17.9 AKI (ACUTE KIDNEY INJURY) (HCC): Primary | ICD-10-CM

## 2024-08-06 DIAGNOSIS — N17.9 AKI (ACUTE KIDNEY INJURY) (HCC): ICD-10-CM

## 2024-08-06 DIAGNOSIS — E66.09 CLASS 2 OBESITY DUE TO EXCESS CALORIES WITHOUT SERIOUS COMORBIDITY WITH BODY MASS INDEX (BMI) OF 39.0 TO 39.9 IN ADULT: ICD-10-CM

## 2024-08-06 DIAGNOSIS — R00.0 TACHYCARDIA: ICD-10-CM

## 2024-08-06 DIAGNOSIS — G47.33 OBSTRUCTIVE SLEEP APNEA SYNDROME: ICD-10-CM

## 2024-08-06 LAB
ALBUMIN: 4.5 G/DL (ref 3.5–5.2)
ALP BLD-CCNC: 72 U/L (ref 40–129)
ALT SERPL-CCNC: 9 U/L (ref 0–40)
ANION GAP SERPL CALCULATED.3IONS-SCNC: 11 MMOL/L (ref 7–16)
AST SERPL-CCNC: 12 U/L (ref 0–39)
BACTERIA: ABNORMAL
BASOPHILS ABSOLUTE: 0.04 K/UL (ref 0–0.2)
BASOPHILS RELATIVE PERCENT: 1 % (ref 0–2)
BILIRUB SERPL-MCNC: 0.4 MG/DL (ref 0–1.2)
BILIRUBIN, URINE: NEGATIVE
BUN BLDV-MCNC: 19 MG/DL (ref 6–23)
CALCIUM SERPL-MCNC: 9.8 MG/DL (ref 8.6–10.2)
CHLORIDE BLD-SCNC: 102 MMOL/L (ref 98–107)
CHOLESTEROL, TOTAL: 220 MG/DL
CO2: 30 MMOL/L (ref 22–29)
COLOR, UA: YELLOW
CREAT SERPL-MCNC: 1.7 MG/DL (ref 0.7–1.2)
EOSINOPHILS ABSOLUTE: 0.22 K/UL (ref 0.05–0.5)
EOSINOPHILS RELATIVE PERCENT: 3 % (ref 0–6)
EPITHELIAL CELLS, UA: ABNORMAL /HPF
GFR, ESTIMATED: 47 ML/MIN/1.73M2
GLUCOSE BLD-MCNC: 92 MG/DL (ref 74–99)
GLUCOSE URINE: NEGATIVE MG/DL
HCT VFR BLD CALC: 39.6 % (ref 37–54)
HDLC SERPL-MCNC: 27 MG/DL
HEMOGLOBIN: 12.6 G/DL (ref 12.5–16.5)
IMMATURE GRANULOCYTES %: 1 % (ref 0–5)
IMMATURE GRANULOCYTES ABSOLUTE: 0.04 K/UL (ref 0–0.58)
KETONES, URINE: NEGATIVE MG/DL
LDL CHOLESTEROL: 124 MG/DL
LEUKOCYTE ESTERASE, URINE: ABNORMAL
LYMPHOCYTES ABSOLUTE: 0.99 K/UL (ref 1.5–4)
LYMPHOCYTES RELATIVE PERCENT: 15 % (ref 20–42)
MCH RBC QN AUTO: 30.3 PG (ref 26–35)
MCHC RBC AUTO-ENTMCNC: 31.8 G/DL (ref 32–34.5)
MCV RBC AUTO: 95.2 FL (ref 80–99.9)
MONOCYTES ABSOLUTE: 0.54 K/UL (ref 0.1–0.95)
MONOCYTES RELATIVE PERCENT: 8 % (ref 2–12)
NEUTROPHILS ABSOLUTE: 4.76 K/UL (ref 1.8–7.3)
NEUTROPHILS RELATIVE PERCENT: 72 % (ref 43–80)
NITRITE, URINE: NEGATIVE
PDW BLD-RTO: 14 % (ref 11.5–15)
PH, URINE: 6 (ref 5–9)
PLATELET # BLD: 230 K/UL (ref 130–450)
PMV BLD AUTO: 9.5 FL (ref 7–12)
POTASSIUM SERPL-SCNC: 4.8 MMOL/L (ref 3.5–5)
PROSTATE SPECIFIC ANTIGEN: 0.25 NG/ML (ref 0–4)
PROTEIN UA: NEGATIVE MG/DL
RBC # BLD: 4.16 M/UL (ref 3.8–5.8)
RBC UA: ABNORMAL /HPF
SODIUM BLD-SCNC: 143 MMOL/L (ref 132–146)
SPECIFIC GRAVITY UA: 1.02 (ref 1–1.03)
TOTAL PROTEIN: 6.9 G/DL (ref 6.4–8.3)
TRIGL SERPL-MCNC: 343 MG/DL
TSH SERPL DL<=0.05 MIU/L-ACNC: 4.05 UIU/ML (ref 0.27–4.2)
TURBIDITY: ABNORMAL
URINE HGB: NEGATIVE
UROBILINOGEN, URINE: 0.2 EU/DL (ref 0–1)
VLDLC SERPL CALC-MCNC: 69 MG/DL
WBC # BLD: 6.6 K/UL (ref 4.5–11.5)
WBC UA: ABNORMAL /HPF

## 2024-08-06 PROCEDURE — 1036F TOBACCO NON-USER: CPT | Performed by: INTERNAL MEDICINE

## 2024-08-06 PROCEDURE — 3074F SYST BP LT 130 MM HG: CPT | Performed by: INTERNAL MEDICINE

## 2024-08-06 PROCEDURE — G8427 DOCREV CUR MEDS BY ELIG CLIN: HCPCS | Performed by: INTERNAL MEDICINE

## 2024-08-06 PROCEDURE — G2211 COMPLEX E/M VISIT ADD ON: HCPCS | Performed by: INTERNAL MEDICINE

## 2024-08-06 PROCEDURE — 3078F DIAST BP <80 MM HG: CPT | Performed by: INTERNAL MEDICINE

## 2024-08-06 PROCEDURE — 99214 OFFICE O/P EST MOD 30 MIN: CPT | Performed by: INTERNAL MEDICINE

## 2024-08-06 PROCEDURE — 3017F COLORECTAL CA SCREEN DOC REV: CPT | Performed by: INTERNAL MEDICINE

## 2024-08-06 PROCEDURE — G8417 CALC BMI ABV UP PARAM F/U: HCPCS | Performed by: INTERNAL MEDICINE

## 2024-08-06 RX ORDER — RISPERIDONE 0.5 MG/1
0.5 TABLET ORAL 2 TIMES DAILY
COMMUNITY

## 2024-08-06 NOTE — PROGRESS NOTES
[facility] work-up underway June 25, 2019  Restless Leg Syndrome - (6/19/2018) TRIAL REQUIP-increase Requip June 25, 2019  Carpal Tunnel Syndrome - (12/21/2018) RIGHT-EMG ORDERED  Right Knee Pain - (2/25/20220  Obesity longstanding  Low testosterone- Drevna- started therapy 8/2022  Severe depression and anxiety 10/1/2023 admitted to the hospital  Referral to Africa Garza 10/20/2023  SURGERIES;  Right knee arthroscopy  Reviewed, no changes.  FH:  Father:  Coronary Artery Disease (CAD).  Mother:  . (Hx)  Reviewed, no changes.  SH:  Marital: .  Personal Habits: Cigarette Use: Negative For current cigarette smoker.Alcohol: Rarely consumes  alcohol.Exercise Type: exercises sporadically.  Reviewed, no changes.  Date:3/12/2024  Was the patient queried about smoking behavior? Yes   Does the patient currently smoke? Smoking: Patient is a current smoker, smokes some days -  CIGARS.    Current Outpatient Medications:     clonazePAM (KLONOPIN) 1 MG tablet, take 1 tablet by mouth up to twice a day if needed, Disp: , Rfl:     lamoTRIgine (LAMICTAL) 25 MG tablet, Take 1 tablet by mouth daily, Disp: , Rfl:     metoprolol succinate (TOPROL XL) 50 MG extended release tablet, Take 1 tablet by mouth daily, Disp: 90 tablet, Rfl: 1    valsartan-hydroCHLOROthiazide (DIOVAN-HCT) 320-25 MG per tablet, take 1 tablet by mouth once daily, Disp: 30 tablet, Rfl: 5    sertraline (ZOLOFT) 100 MG tablet, Take 1 tablet by mouth daily, Disp: 30 tablet, Rfl: 0    risperiDONE (RISPERDAL) 1 MG tablet, Take 1 tablet by mouth nightly, Disp: 30 tablet, Rfl: 0  Allergies   Allergen Reactions    Honey Bee Venom Protein [Honey Bee Venom]        Past Medical History:   Diagnosis Date    Anxiety     Carpal tunnel syndrome of right wrist     Cervical radiculopathy 04/11/2016    recurrent    Chronic pain     Erectile dysfunction     Hyperlipidemia     Hypertension     Hypogonadism in male 12/20/2017    Lumbar strain 12/13/2016    recurrent    Osteoarthritis

## 2024-08-07 DIAGNOSIS — N17.9 AKI (ACUTE KIDNEY INJURY) (HCC): Primary | ICD-10-CM

## 2024-08-07 DIAGNOSIS — R82.90 ABNORMAL URINALYSIS: Primary | ICD-10-CM

## 2024-09-26 ENCOUNTER — PATIENT MESSAGE (OUTPATIENT)
Dept: FAMILY MEDICINE CLINIC | Age: 59
End: 2024-09-26

## 2024-09-26 DIAGNOSIS — G89.29 OTHER CHRONIC PAIN: Primary | ICD-10-CM

## 2024-09-26 RX ORDER — HYDROCODONE BITARTRATE AND ACETAMINOPHEN 7.5; 325 MG/1; MG/1
1 TABLET ORAL NIGHTLY
Qty: 90 TABLET | Refills: 0 | Status: SHIPPED | OUTPATIENT
Start: 2024-09-26 | End: 2024-12-25

## 2024-10-25 ENCOUNTER — TELEPHONE (OUTPATIENT)
Dept: FAMILY MEDICINE CLINIC | Age: 59
End: 2024-10-25

## 2024-10-25 NOTE — TELEPHONE ENCOUNTER
Patients wife called into office and stated that patient was seen in office on 8/6/2024 he did not mention to you that he was having blood in his stools, he forgot. Apparently patient has had the last few months, it is not more persistent than it has been however patient stated that it is heavy. He denies blood with urination. Patients last colonoscopy was 03/2022. Please advise?

## 2024-10-25 NOTE — TELEPHONE ENCOUNTER
I called and spoke with spouse, who's on communications form.      Patient is not having any pain.  Patient is having hard stools.  Patient states the amount of blood is on the toilet tissue, the size of a quarter.  I discussed this with Dr Caldwell, and he felt patient was able to be seen by him 11/1

## 2024-10-31 ENCOUNTER — PATIENT MESSAGE (OUTPATIENT)
Dept: FAMILY MEDICINE CLINIC | Age: 59
End: 2024-10-31

## 2024-10-31 DIAGNOSIS — G89.29 OTHER CHRONIC PAIN: ICD-10-CM

## 2024-11-04 RX ORDER — HYDROCODONE BITARTRATE AND ACETAMINOPHEN 7.5; 325 MG/1; MG/1
1 TABLET ORAL NIGHTLY
Qty: 30 TABLET | Refills: 0 | Status: SHIPPED | OUTPATIENT
Start: 2024-11-04 | End: 2025-02-02

## 2024-11-04 NOTE — TELEPHONE ENCOUNTER
I spoke with [facility] the mail order Co. They did receive the Rx, first they told me he needed a Prior Auth.  So I asked if they had forwarded us a notification, which Bambi told me they did not and that they don't do this.  Then  investigated further and told me that patient does not have an active account.  In the mean time patient has asked if we can just send a [facility].    I pended this for you.

## 2024-12-05 DIAGNOSIS — G89.29 OTHER CHRONIC PAIN: ICD-10-CM

## 2024-12-06 RX ORDER — HYDROCODONE BITARTRATE AND ACETAMINOPHEN 7.5; 325 MG/1; MG/1
1 TABLET ORAL NIGHTLY
Qty: 30 TABLET | Refills: 0 | Status: SHIPPED | OUTPATIENT
Start: 2024-12-06 | End: 2025-03-06

## 2024-12-06 NOTE — TELEPHONE ENCOUNTER
Name of Medication(s) Requested:  Requested Prescriptions     Pending Prescriptions Disp Refills    HYDROcodone-acetaminophen (NORCO) 7.5-325 MG per tablet 30 tablet 0     Sig: Take 1 tablet by mouth at bedtime for 90 days. Intended supply: 30 days Max Daily Amount: 1 tablet       Medication is on current medication list Yes    Dosage and directions were verified? Yes    Quantity verified: 30 day supply     Pharmacy Verified?  Yes    Last Appointment:  8/6/2024    Future appts:  Future Appointments   Date Time Provider Department Center   2/5/2025  3:00 PM Giorgio Caldwell MD [facility]        (If no appt send self scheduling link. .REFILLAPPT)  Scheduling request sent?     [] Yes  [x] No    Does patient need updated?  [] Yes  [x] No

## 2025-01-17 DIAGNOSIS — G89.29 OTHER CHRONIC PAIN: ICD-10-CM

## 2025-01-17 RX ORDER — METOPROLOL SUCCINATE 50 MG/1
50 TABLET, EXTENDED RELEASE ORAL DAILY
Qty: 90 TABLET | Refills: 1 | Status: SHIPPED | OUTPATIENT
Start: 2025-01-17

## 2025-01-17 RX ORDER — HYDROCODONE BITARTRATE AND ACETAMINOPHEN 7.5; 325 MG/1; MG/1
1 TABLET ORAL NIGHTLY
Qty: 30 TABLET | Refills: 0 | Status: SHIPPED | OUTPATIENT
Start: 2025-01-17 | End: 2025-04-17

## 2025-02-05 ENCOUNTER — OFFICE VISIT (OUTPATIENT)
Dept: FAMILY MEDICINE CLINIC | Age: 60
End: 2025-02-05

## 2025-02-05 VITALS
WEIGHT: 242 LBS | DIASTOLIC BLOOD PRESSURE: 60 MMHG | TEMPERATURE: 97.8 F | HEART RATE: 91 BPM | BODY MASS INDEX: 31.07 KG/M2 | SYSTOLIC BLOOD PRESSURE: 120 MMHG | OXYGEN SATURATION: 97 %

## 2025-02-05 DIAGNOSIS — I10 PRIMARY HYPERTENSION: ICD-10-CM

## 2025-02-05 DIAGNOSIS — R41.3 MEMORY LOSS: ICD-10-CM

## 2025-02-05 DIAGNOSIS — F32.2 CURRENT SEVERE EPISODE OF MAJOR DEPRESSIVE DISORDER WITHOUT PSYCHOTIC FEATURES, UNSPECIFIED WHETHER RECURRENT (HCC): Primary | ICD-10-CM

## 2025-02-05 DIAGNOSIS — E78.2 MIXED HYPERLIPIDEMIA: ICD-10-CM

## 2025-02-05 DIAGNOSIS — F41.9 ANXIETY: ICD-10-CM

## 2025-02-05 DIAGNOSIS — E29.1 HYPOGONADISM IN MALE: ICD-10-CM

## 2025-02-05 LAB
ALBUMIN: 4.8 G/DL (ref 3.5–5.2)
ALP BLD-CCNC: 74 U/L (ref 40–129)
ALT SERPL-CCNC: 6 U/L (ref 0–40)
AMORPHOUS: PRESENT
ANION GAP SERPL CALCULATED.3IONS-SCNC: 16 MMOL/L (ref 7–16)
AST SERPL-CCNC: 9 U/L (ref 0–39)
BACTERIA: ABNORMAL
BASOPHILS ABSOLUTE: 0.05 K/UL (ref 0–0.2)
BASOPHILS RELATIVE PERCENT: 1 % (ref 0–2)
BILIRUB SERPL-MCNC: 0.5 MG/DL (ref 0–1.2)
BILIRUBIN, URINE: NEGATIVE
BUN BLDV-MCNC: 20 MG/DL (ref 6–23)
CALCIUM SERPL-MCNC: 9.6 MG/DL (ref 8.6–10.2)
CHLORIDE BLD-SCNC: 106 MMOL/L (ref 98–107)
CHOLESTEROL, TOTAL: 205 MG/DL
CO2: 24 MMOL/L (ref 22–29)
COLOR, UA: YELLOW
CREAT SERPL-MCNC: 1.6 MG/DL (ref 0.7–1.2)
CRYSTALS, UA: ABNORMAL /HPF
EOSINOPHILS ABSOLUTE: 0.28 K/UL (ref 0.05–0.5)
EOSINOPHILS RELATIVE PERCENT: 5 % (ref 0–6)
EPITHELIAL CELLS, UA: ABNORMAL /HPF
FOLATE: 5.9 NG/ML (ref 4.8–24.2)
GFR, ESTIMATED: 47 ML/MIN/1.73M2
GLUCOSE BLD-MCNC: 100 MG/DL (ref 74–99)
GLUCOSE URINE: NEGATIVE MG/DL
HCT VFR BLD CALC: 44.1 % (ref 37–54)
HDLC SERPL-MCNC: 29 MG/DL
HEMOGLOBIN: 14.6 G/DL (ref 12.5–16.5)
IMMATURE GRANULOCYTES %: 1 % (ref 0–5)
IMMATURE GRANULOCYTES ABSOLUTE: 0.03 K/UL (ref 0–0.58)
KETONES, URINE: NEGATIVE MG/DL
LDL CHOLESTEROL: 138 MG/DL
LEUKOCYTE ESTERASE, URINE: ABNORMAL
LYMPHOCYTES ABSOLUTE: 0.98 K/UL (ref 1.5–4)
LYMPHOCYTES RELATIVE PERCENT: 18 % (ref 20–42)
MCH RBC QN AUTO: 29.8 PG (ref 26–35)
MCHC RBC AUTO-ENTMCNC: 33.1 G/DL (ref 32–34.5)
MCV RBC AUTO: 90 FL (ref 80–99.9)
MONOCYTES ABSOLUTE: 0.45 K/UL (ref 0.1–0.95)
MONOCYTES RELATIVE PERCENT: 8 % (ref 2–12)
MUCUS: PRESENT
NEUTROPHILS ABSOLUTE: 3.76 K/UL (ref 1.8–7.3)
NEUTROPHILS RELATIVE PERCENT: 68 % (ref 43–80)
NITRITE, URINE: NEGATIVE
PDW BLD-RTO: 12.6 % (ref 11.5–15)
PH, URINE: 5.5 (ref 5–8)
PLATELET # BLD: 260 K/UL (ref 130–450)
PMV BLD AUTO: 9.5 FL (ref 7–12)
POTASSIUM SERPL-SCNC: 4 MMOL/L (ref 3.5–5)
PROTEIN UA: NEGATIVE MG/DL
RBC # BLD: 4.9 M/UL (ref 3.8–5.8)
RBC UA: ABNORMAL /HPF
SODIUM BLD-SCNC: 146 MMOL/L (ref 132–146)
SPECIFIC GRAVITY UA: >1.03 (ref 1–1.03)
TOTAL PROTEIN: 7.2 G/DL (ref 6.4–8.3)
TRIGL SERPL-MCNC: 189 MG/DL
TSH SERPL DL<=0.05 MIU/L-ACNC: 2.48 UIU/ML (ref 0.27–4.2)
TURBIDITY: CLEAR
URINE HGB: NEGATIVE
UROBILINOGEN, URINE: 0.2 EU/DL (ref 0–1)
VITAMIN B-12: 476 PG/ML (ref 211–946)
VLDLC SERPL CALC-MCNC: 38 MG/DL
WBC # BLD: 5.6 K/UL (ref 4.5–11.5)
WBC UA: ABNORMAL /HPF

## 2025-02-05 RX ORDER — LISDEXAMFETAMINE DIMESYLATE 40 MG/1
40 CAPSULE ORAL DAILY
Qty: 30 CAPSULE | Refills: 0
Start: 2025-02-05 | End: 2025-03-07

## 2025-02-05 RX ORDER — LISDEXAMFETAMINE DIMESYLATE 40 MG/1
40 CAPSULE ORAL DAILY
Qty: 30 CAPSULE | Refills: 0
Start: 2025-04-06 | End: 2025-05-06

## 2025-02-05 RX ORDER — LISDEXAMFETAMINE DIMESYLATE 40 MG/1
40 CAPSULE ORAL DAILY
Qty: 30 CAPSULE | Refills: 0
Start: 2025-03-07 | End: 2025-04-06

## 2025-02-05 ASSESSMENT — PATIENT HEALTH QUESTIONNAIRE - PHQ9
SUM OF ALL RESPONSES TO PHQ QUESTIONS 1-9: 18
7. TROUBLE CONCENTRATING ON THINGS, SUCH AS READING THE NEWSPAPER OR WATCHING TELEVISION: NOT AT ALL
8. MOVING OR SPEAKING SO SLOWLY THAT OTHER PEOPLE COULD HAVE NOTICED. OR THE OPPOSITE, BEING SO FIGETY OR RESTLESS THAT YOU HAVE BEEN MOVING AROUND A LOT MORE THAN USUAL: NEARLY EVERY DAY
10. IF YOU CHECKED OFF ANY PROBLEMS, HOW DIFFICULT HAVE THESE PROBLEMS MADE IT FOR YOU TO DO YOUR WORK, TAKE CARE OF THINGS AT HOME, OR GET ALONG WITH OTHER PEOPLE: VERY DIFFICULT
SUM OF ALL RESPONSES TO PHQ QUESTIONS 1-9: 18
3. TROUBLE FALLING OR STAYING ASLEEP: NEARLY EVERY DAY
9. THOUGHTS THAT YOU WOULD BE BETTER OFF DEAD, OR OF HURTING YOURSELF: NOT AT ALL
5. POOR APPETITE OR OVEREATING: NEARLY EVERY DAY
2. FEELING DOWN, DEPRESSED OR HOPELESS: NEARLY EVERY DAY
4. FEELING TIRED OR HAVING LITTLE ENERGY: NEARLY EVERY DAY
6. FEELING BAD ABOUT YOURSELF - OR THAT YOU ARE A FAILURE OR HAVE LET YOURSELF OR YOUR FAMILY DOWN: NEARLY EVERY DAY

## 2025-02-05 NOTE — PROGRESS NOTES
19  Carlo Parmar : 1965 Sex: male  Age: 59 y.o.    Chief Complaint   Patient presents with    6 Month Follow-Up         This is a patient with severe depression and lots of symptoms of psychomotor retardation.  He is going to counselor and he is also gone to psychiatry.  Has been tried on a good number of different medications without much success.  MRI stimulation was not approved.  I am questioning whether or not the patient may benefit from ECT therapy.  I will speak to Dr. Garza his psychiatrist regarding this.  The counselor was questioning whether there were some organic cause of the issue.  I will work this up but I highly doubt this as it is very fluctuant.  Some days he has good days and he is able to do a lot of things other days he is not able to.  He is sleeping 15 or 16 hours a day at times.  He is denying any cardiac symptomatology.  He has no focal neurologic deficits.  His executive functioning seems to be intact as he could give me directions back to his home from this location.  He denies any GI complaints or  complaints.  No significant musculoskeletal complaints although he does seem to be weaker than he was.  He has not experienced head trauma over the last 15 or 20 years.  He does have a family history of dementia.    Hypertension  Associated symptoms include anxiety.   Other    Hyperlipidemia    Anxiety        Depression      Review of Systems  Health Maintenance:  Colonoscopy Screening - (2015)-  Influenza Vaccination - (10/20/2020) REFUSES  Physical Exam -2024  Prostate Exam - (2022)  Psa Test -  RDREVNA  Rectal Exam - (2022)  Colonoscopy - (2015) KOLOZSI-NEXT 3/4/2022-next   Medical Problems:  Hypertension, Hypercholesterolemia, Osteoarthritis  Panic Disorder - (2014) RESTARTED ZOLOFT  Anxiety  Decreased Libido - (8/10/2012) EVALUATED BY DARREN  Cervical Radiculopathy - (2017) RECURRENT  Lumbar Strain - (2016)

## 2025-02-06 DIAGNOSIS — R82.90 ABNORMAL FINDING ON URINALYSIS: Primary | ICD-10-CM

## 2025-02-06 LAB
RPR: NONREACTIVE
SED RATE, AUTOMATED: 6 MM/HR (ref 0–15)

## 2025-02-07 DIAGNOSIS — R82.90 ABNORMAL FINDING ON URINALYSIS: ICD-10-CM

## 2025-02-07 LAB
PROSTATE SPECIFIC ANTIGEN: 0.28 NG/ML (ref 0–4)
SEX HORMONE BINDING GLOBULIN: 34 NMOL/L (ref 19–76)
TESTOSTERONE FREE-NONMALE: 69.9 PG/ML (ref 47–244)
TESTOSTERONE TOTAL: 355 NG/DL (ref 193–740)
TESTOSTERONE, BIOAVAILABLE: 163.7 NG/DL (ref 130–680)

## 2025-02-28 DIAGNOSIS — G89.29 OTHER CHRONIC PAIN: ICD-10-CM

## 2025-03-03 NOTE — TELEPHONE ENCOUNTER
Last Appointment:  2/5/2025  Future Appointments   Date Time Provider Department Center   3/21/2025  6:30 PM [facility]

## 2025-03-04 RX ORDER — HYDROCODONE BITARTRATE AND ACETAMINOPHEN 7.5; 325 MG/1; MG/1
1 TABLET ORAL NIGHTLY
Qty: 30 TABLET | Refills: 0 | Status: SHIPPED | OUTPATIENT
Start: 2025-03-04 | End: 2025-06-02

## 2025-04-11 DIAGNOSIS — G89.29 OTHER CHRONIC PAIN: ICD-10-CM

## 2025-04-11 RX ORDER — HYDROCODONE BITARTRATE AND ACETAMINOPHEN 7.5; 325 MG/1; MG/1
1 TABLET ORAL NIGHTLY
Qty: 30 TABLET | Refills: 0 | Status: SHIPPED | OUTPATIENT
Start: 2025-04-11 | End: 2025-07-10

## 2025-06-01 DIAGNOSIS — G89.29 OTHER CHRONIC PAIN: ICD-10-CM

## 2025-06-03 RX ORDER — HYDROCODONE BITARTRATE AND ACETAMINOPHEN 7.5; 325 MG/1; MG/1
1 TABLET ORAL NIGHTLY
Qty: 30 TABLET | Refills: 0 | Status: SHIPPED | OUTPATIENT
Start: 2025-06-03 | End: 2025-09-01

## 2025-06-03 NOTE — TELEPHONE ENCOUNTER
Name of Medication(s) Requested:  Requested Prescriptions     Pending Prescriptions Disp Refills    HYDROcodone-acetaminophen (NORCO) 7.5-325 MG per tablet 30 tablet 0     Sig: Take 1 tablet by mouth at bedtime for 90 days. Intended supply: 30 days Max Daily Amount: 1 tablet       Medication is on current medication list Yes    Dosage and directions were verified? Yes    Quantity verified: 30 day supply     Pharmacy Verified?  Yes    Last Appointment:  2/5/2025    Future appts:  No future appointments.     (If no appt send self scheduling link. .REFILLAPPT)  Scheduling request sent?     [] Yes  [x] No    Does patient need updated?  [] Yes  [x] No

## 2025-07-11 ENCOUNTER — OFFICE VISIT (OUTPATIENT)
Dept: FAMILY MEDICINE CLINIC | Age: 60
End: 2025-07-11
Payer: COMMERCIAL

## 2025-07-11 VITALS
OXYGEN SATURATION: 97 % | TEMPERATURE: 98.1 F | WEIGHT: 241 LBS | HEIGHT: 74 IN | BODY MASS INDEX: 30.93 KG/M2 | DIASTOLIC BLOOD PRESSURE: 72 MMHG | HEART RATE: 104 BPM | SYSTOLIC BLOOD PRESSURE: 120 MMHG | RESPIRATION RATE: 18 BRPM

## 2025-07-11 DIAGNOSIS — I10 PRIMARY HYPERTENSION: ICD-10-CM

## 2025-07-11 DIAGNOSIS — E29.1 HYPOGONADISM IN MALE: ICD-10-CM

## 2025-07-11 DIAGNOSIS — E78.2 MIXED HYPERLIPIDEMIA: ICD-10-CM

## 2025-07-11 DIAGNOSIS — E66.813 CLASS 3 SEVERE OBESITY DUE TO EXCESS CALORIES WITH BODY MASS INDEX (BMI) OF 40.0 TO 44.9 IN ADULT (HCC): ICD-10-CM

## 2025-07-11 DIAGNOSIS — G89.29 OTHER CHRONIC PAIN: ICD-10-CM

## 2025-07-11 DIAGNOSIS — F41.1 GAD (GENERALIZED ANXIETY DISORDER): ICD-10-CM

## 2025-07-11 DIAGNOSIS — Z12.5 PROSTATE CANCER SCREENING: ICD-10-CM

## 2025-07-11 DIAGNOSIS — M15.0 PRIMARY OSTEOARTHRITIS INVOLVING MULTIPLE JOINTS: ICD-10-CM

## 2025-07-11 DIAGNOSIS — N52.01 ERECTILE DYSFUNCTION DUE TO ARTERIAL INSUFFICIENCY: ICD-10-CM

## 2025-07-11 DIAGNOSIS — R00.0 TACHYCARDIA: Primary | ICD-10-CM

## 2025-07-11 DIAGNOSIS — R00.0 TACHYCARDIA: ICD-10-CM

## 2025-07-11 LAB
ALBUMIN: 4.3 G/DL (ref 3.5–5.2)
ALP BLD-CCNC: 92 U/L (ref 40–129)
ALT SERPL-CCNC: 11 U/L (ref 0–50)
ANION GAP SERPL CALCULATED.3IONS-SCNC: 12 MMOL/L (ref 7–16)
AST SERPL-CCNC: 17 U/L (ref 0–50)
BASOPHILS ABSOLUTE: 0.03 K/UL (ref 0–0.2)
BASOPHILS RELATIVE PERCENT: 1 % (ref 0–2)
BILIRUB SERPL-MCNC: 0.3 MG/DL (ref 0–1.2)
BUN BLDV-MCNC: 21 MG/DL (ref 8–23)
CALCIUM SERPL-MCNC: 9.5 MG/DL (ref 8.8–10.2)
CHLORIDE BLD-SCNC: 106 MMOL/L (ref 98–107)
CHOLESTEROL, TOTAL: 180 MG/DL
CO2: 23 MMOL/L (ref 22–29)
CREAT SERPL-MCNC: 1.3 MG/DL (ref 0.7–1.2)
EOSINOPHILS ABSOLUTE: 0.21 K/UL (ref 0.05–0.5)
EOSINOPHILS RELATIVE PERCENT: 4 % (ref 0–6)
GFR, ESTIMATED: 64 ML/MIN/1.73M2
GLUCOSE BLD-MCNC: 98 MG/DL (ref 74–99)
HCT VFR BLD CALC: 40.5 % (ref 37–54)
HDLC SERPL-MCNC: 34 MG/DL
HEMOGLOBIN: 13 G/DL (ref 12.5–16.5)
IMMATURE GRANULOCYTES %: 1 % (ref 0–5)
IMMATURE GRANULOCYTES ABSOLUTE: 0.04 K/UL (ref 0–0.58)
LDL CHOLESTEROL: 105 MG/DL
LYMPHOCYTES ABSOLUTE: 0.65 K/UL (ref 1.5–4)
LYMPHOCYTES RELATIVE PERCENT: 11 % (ref 20–42)
MCH RBC QN AUTO: 30.1 PG (ref 26–35)
MCHC RBC AUTO-ENTMCNC: 32.1 G/DL (ref 32–34.5)
MCV RBC AUTO: 93.8 FL (ref 80–99.9)
MONOCYTES ABSOLUTE: 0.61 K/UL (ref 0.1–0.95)
MONOCYTES RELATIVE PERCENT: 10 % (ref 2–12)
NEUTROPHILS ABSOLUTE: 4.39 K/UL (ref 1.8–7.3)
NEUTROPHILS RELATIVE PERCENT: 74 % (ref 43–80)
PDW BLD-RTO: 13 % (ref 11.5–15)
PLATELET # BLD: 224 K/UL (ref 130–450)
PMV BLD AUTO: 9.7 FL (ref 7–12)
POTASSIUM SERPL-SCNC: 4.3 MMOL/L (ref 3.5–5.1)
PROSTATE SPECIFIC ANTIGEN: 0.29 NG/ML (ref 0–4)
RBC # BLD: 4.32 M/UL (ref 3.8–5.8)
SODIUM BLD-SCNC: 140 MMOL/L (ref 136–145)
TOTAL PROTEIN: 6.5 G/DL (ref 6.4–8.3)
TRIGL SERPL-MCNC: 206 MG/DL
TSH SERPL DL<=0.05 MIU/L-ACNC: 2.43 UIU/ML (ref 0.27–4.2)
VLDLC SERPL CALC-MCNC: 41 MG/DL
WBC # BLD: 5.9 K/UL (ref 4.5–11.5)

## 2025-07-11 PROCEDURE — 3078F DIAST BP <80 MM HG: CPT | Performed by: INTERNAL MEDICINE

## 2025-07-11 PROCEDURE — G8417 CALC BMI ABV UP PARAM F/U: HCPCS | Performed by: INTERNAL MEDICINE

## 2025-07-11 PROCEDURE — 3074F SYST BP LT 130 MM HG: CPT | Performed by: INTERNAL MEDICINE

## 2025-07-11 PROCEDURE — 1036F TOBACCO NON-USER: CPT | Performed by: INTERNAL MEDICINE

## 2025-07-11 PROCEDURE — G8427 DOCREV CUR MEDS BY ELIG CLIN: HCPCS | Performed by: INTERNAL MEDICINE

## 2025-07-11 PROCEDURE — 99214 OFFICE O/P EST MOD 30 MIN: CPT | Performed by: INTERNAL MEDICINE

## 2025-07-11 PROCEDURE — 3017F COLORECTAL CA SCREEN DOC REV: CPT | Performed by: INTERNAL MEDICINE

## 2025-07-11 RX ORDER — TADALAFIL 5 MG/1
TABLET ORAL
Qty: 120 TABLET | Refills: 1 | Status: SHIPPED | OUTPATIENT
Start: 2025-07-11

## 2025-07-11 RX ORDER — HYDROCODONE BITARTRATE AND ACETAMINOPHEN 5; 325 MG/1; MG/1
1 TABLET ORAL EVERY 6 HOURS PRN
Qty: 120 TABLET | Refills: 0 | Status: SHIPPED | OUTPATIENT
Start: 2025-07-11 | End: 2025-08-10

## 2025-07-11 RX ORDER — METOPROLOL SUCCINATE 50 MG/1
50 TABLET, EXTENDED RELEASE ORAL DAILY
Qty: 90 TABLET | Refills: 1 | Status: SHIPPED | OUTPATIENT
Start: 2025-07-11

## 2025-07-11 RX ORDER — CLOMIPRAMINE HYDROCHLORIDE 50 MG/1
100 CAPSULE ORAL DAILY
COMMUNITY
Start: 2025-05-30

## 2025-07-11 NOTE — PROGRESS NOTES
Difficulty of Paying Living Expenses: Not hard at all   Food Insecurity: Not on file (4/6/2023)   Transportation Needs: Unknown (4/6/2023)    PRAPARE - Transportation     Lack of Transportation (Medical): Not on file     Lack of Transportation (Non-Medical): No   Physical Activity: Not on file   Stress: Not on file   Social Connections: Not on file   Intimate Partner Violence: Not on file   Housing Stability: Unknown (4/6/2023)    Housing Stability Vital Sign     Unable to Pay for Housing in the Last Year: Not on file     Number of Places Lived in the Last Year: Not on file     Unstable Housing in the Last Year: No       Vitals:    07/11/25 1004   BP: 120/72   Pulse: (!) 104   Resp: 18   Temp: 98.1 °F (36.7 °C)   TempSrc: Temporal   SpO2: 97%   Weight: 109.3 kg (241 lb)   Height: 1.88 m (6' 2\")         Physical Exam  Objective    Exam:  Const: Improved engagement on examination  Eyes: EOMI in both eyes. PERRL.  ENMT: External canals are clear and dry. Tympanic membranes are intact. External nose WNL.  Neck: Supple and symmetric. Palpation reveals no adenopathy. No masses appreciated. Thyroid  exhibits no nodules or thyromegaly. No JVD.  Resp: Respirations are unlabored. Respiration rate is normal. Auscultate good airflow. No rales,  rhonchi or wheezes appreciated over the lungs bilaterally.  CV: Rhythm is regular. S1 is normal. S2 is normal. Grade 2/6, systolic murmur. Carotids: no  bruits. Abdominal aorta is not palpable. Pedal pulses: 2+ and equal bilaterally.  Extremities: No clubbing, cyanosis or edema.  Abdomen: Bowel sounds are normoactive. Palpation of the abdomen reveals softness, but no  distension, organomegaly or tenderness. No abdominal masses. No palpable hepatosplenomegaly.  Musculo: Walks with a normal gait. Upper Extremities: Full ROM bilaterally. Lower Extremities:  Crepitation of the lower extremities.  Skin: No evidence of stasis dermatitis of the lower extremity  Neuro: Alert and oriented x3.

## 2025-07-13 LAB
SEX HORMONE BINDING GLOBULIN: 30 NMOL/L (ref 19–76)
TESTOSTERONE FREE-NONMALE: 50.7 PG/ML (ref 47–244)
TESTOSTERONE TOTAL: 248 NG/DL (ref 193–740)
TESTOSTERONE, BIOAVAILABLE: 118.7 NG/DL (ref 130–680)

## 2025-07-16 RX ORDER — ROSUVASTATIN CALCIUM 10 MG/1
10 TABLET, COATED ORAL DAILY
Qty: 90 TABLET | Refills: 1 | Status: SHIPPED | OUTPATIENT
Start: 2025-07-16